# Patient Record
Sex: MALE | Race: BLACK OR AFRICAN AMERICAN | Employment: UNEMPLOYED | URBAN - METROPOLITAN AREA
[De-identification: names, ages, dates, MRNs, and addresses within clinical notes are randomized per-mention and may not be internally consistent; named-entity substitution may affect disease eponyms.]

---

## 2017-11-28 ENCOUNTER — HOSPITAL ENCOUNTER (EMERGENCY)
Age: 22
Discharge: HOME | End: 2017-11-28
Attending: EMERGENCY MEDICINE
Payer: MEDICAID

## 2017-11-28 VITALS
RESPIRATION RATE: 18 BRPM | SYSTOLIC BLOOD PRESSURE: 135 MMHG | TEMPERATURE: 98 F | OXYGEN SATURATION: 100 % | HEART RATE: 83 BPM | DIASTOLIC BLOOD PRESSURE: 79 MMHG

## 2017-11-28 DIAGNOSIS — K02.9 PAIN DUE TO DENTAL CARIES: Primary | ICD-10-CM

## 2017-11-28 PROCEDURE — 99282 EMERGENCY DEPT VISIT SF MDM: CPT

## 2017-11-28 RX ORDER — IBUPROFEN 600 MG/1
600 TABLET ORAL
Qty: 30 TAB | Refills: 0 | Status: SHIPPED | OUTPATIENT
Start: 2017-11-28

## 2017-11-28 RX ORDER — ESCITALOPRAM OXALATE 10 MG/1
10 TABLET ORAL DAILY
COMMUNITY

## 2017-11-28 RX ORDER — HYDROXYZINE PAMOATE 25 MG/1
25 CAPSULE ORAL
COMMUNITY

## 2017-11-28 RX ORDER — ACETAMINOPHEN 500 MG
1000 TABLET ORAL
Qty: 40 TAB | Refills: 0 | Status: SHIPPED | OUTPATIENT
Start: 2017-11-28

## 2017-11-28 NOTE — ED PROVIDER NOTES
HPI Comments: Bertha Baumann is a 23 y.o. Male with c/o dental pain for last 2 months on lower post teeth. No facial swelling, fever. Has 2 holes in teeth. No diff eating, swallowing,. Pain is throbbing, intermittent    The history is provided by the patient. Past Medical History:   Diagnosis Date    Depression        History reviewed. No pertinent surgical history. History reviewed. No pertinent family history. Social History     Social History    Marital status: SINGLE     Spouse name: N/A    Number of children: N/A    Years of education: N/A     Occupational History    Not on file. Social History Main Topics    Smoking status: Never Smoker    Smokeless tobacco: Never Used    Alcohol use No    Drug use: Not on file    Sexual activity: Not on file     Other Topics Concern    Not on file     Social History Narrative         ALLERGIES: Review of patient's allergies indicates no known allergies. Review of Systems   Constitutional: Negative for fever. HENT: Positive for dental problem. Negative for sore throat and trouble swallowing. Respiratory: Negative for shortness of breath. Cardiovascular: Negative for chest pain. Gastrointestinal: Negative for abdominal pain. Musculoskeletal: Negative for gait problem. Allergic/Immunologic: Negative for immunocompromised state. Psychiatric/Behavioral: Negative for sleep disturbance. Vitals:    11/28/17 0053   BP: 135/79   Pulse: 83   Resp: 18   Temp: 98 °F (36.7 °C)   SpO2: 100%            Physical Exam   Constitutional: He is oriented to person, place, and time. Non-toxic appearance. He does not appear ill. No distress. Pt eating in waiting room without diff   HENT:   Head: Normocephalic and atraumatic. Right Ear: External ear normal.   Left Ear: External ear normal.   Nose: Nose normal.   Mouth/Throat: Uvula is midline, oropharynx is clear and moist and mucous membranes are normal. No oral lesions.  No trismus in the jaw. Abnormal dentition. Dental caries present. No dental abscesses or uvula swelling. No oropharyngeal exudate, posterior oropharyngeal edema or posterior oropharyngeal erythema. Eyes: Conjunctivae are normal.   Neck: Normal range of motion. Cardiovascular: Normal rate, regular rhythm, normal heart sounds and intact distal pulses. Pulmonary/Chest: Effort normal and breath sounds normal. No respiratory distress. Abdominal: There is no tenderness. Musculoskeletal: Normal range of motion. Neurological: He is alert and oriented to person, place, and time. Skin: Skin is warm and dry. He is not diaphoretic. Psychiatric: His behavior is normal.   Nursing note and vitals reviewed. [facility]  ED Course       Procedures    Vitals:  Patient Vitals for the past 12 hrs:   Temp Pulse Resp BP SpO2   11/28/17 0053 98 °F (36.7 °C) 83 18 135/79 100 %         Medications ordered:   Medications - No data to display      Lab findings:  No results found for this or any previous visit (from the past 12 hour(s)). EKG interpretation by ED Physician:      X-Ray, CT or other radiology findings or impressions:  No orders to display       Progress notes, Consult notes or additional Procedure notes:   Doubt need for abx, any testing  I have discussed with patient and/or family/sig other the results, interpretation of any imaging if performed, suspected diagnosis and treatment plan to include instructions regarding the diagnoses listed to which understanding was expressed with all questions answered      Reevaluation of patient:   Stable for dc    Disposition:  Diagnosis:   1.  Pain due to dental caries        Disposition: home      Follow-up Information     Follow up With Details Comments Contact Info    [address] [facility] [address] Schedule an appointment as soon as possible for a visit  [address]  [phone]            Patient's Medications   Start Taking    ACETAMINOPHEN ([facility]  [address] STRENGTH) 500 MG TABLET    Take 2 Tabs by mouth every six (6) hours as needed for Pain. IBUPROFEN (MOTRIN) 600 MG TABLET    Take 1 Tab by mouth every six (6) hours as needed for Pain. Continue Taking    ESCITALOPRAM OXALATE (LEXAPRO) 10 MG TABLET    Take 10 mg by mouth daily. HYDROXYZINE PAMOATE (VISTARIL) 25 MG CAPSULE    Take 25 mg by mouth three (3) times daily as needed for Itching. These Medications have changed    No medications on file   Stop Taking    LORAZEPAM (ATIVAN) 1 MG TABLET    Take 1 Tab by mouth as needed for Anxiety.     as

## 2017-11-28 NOTE — ED NOTES
I have reviewed discharge instructions with the patient. The patient verbalized understanding. Discharge medications reviewed with patient and appropriate educational materials and side effects teaching were provided. Follow up reviewed. Patient armband removed and shredded.

## 2020-06-13 ENCOUNTER — HOSPITAL ENCOUNTER (EMERGENCY)
Age: 25
Discharge: PSYCHIATRIC OR SUBSTANCE USE FACILITY | End: 2020-06-14
Attending: EMERGENCY MEDICINE
Payer: MEDICAID

## 2020-06-13 DIAGNOSIS — R46.89 AGGRESSIVE BEHAVIOR: ICD-10-CM

## 2020-06-13 DIAGNOSIS — F10.920 ACUTE ALCOHOLIC INTOXICATION WITHOUT COMPLICATION (HCC): ICD-10-CM

## 2020-06-13 DIAGNOSIS — F20.9 SCHIZOPHRENIA, UNSPECIFIED TYPE (HCC): Primary | ICD-10-CM

## 2020-06-13 LAB
ALBUMIN SERPL-MCNC: 4.3 G/DL (ref 3.4–5)
ALBUMIN/GLOB SERPL: 1.3 {RATIO} (ref 0.8–1.7)
ALP SERPL-CCNC: 88 U/L (ref 45–117)
ALT SERPL-CCNC: 25 U/L (ref 16–61)
AMPHET UR QL SCN: NEGATIVE
ANION GAP SERPL CALC-SCNC: 9 MMOL/L (ref 3–18)
AST SERPL-CCNC: 22 U/L (ref 10–38)
BARBITURATES UR QL SCN: NEGATIVE
BASOPHILS # BLD: 0 K/UL (ref 0–0.1)
BASOPHILS NFR BLD: 0 % (ref 0–2)
BENZODIAZ UR QL: NEGATIVE
BILIRUB SERPL-MCNC: 0.5 MG/DL (ref 0.2–1)
BUN SERPL-MCNC: 10 MG/DL (ref 7–18)
BUN/CREAT SERPL: 9 (ref 12–20)
CALCIUM SERPL-MCNC: 8.9 MG/DL (ref 8.5–10.1)
CANNABINOIDS UR QL SCN: POSITIVE
CHLORIDE SERPL-SCNC: 109 MMOL/L (ref 100–111)
CO2 SERPL-SCNC: 22 MMOL/L (ref 21–32)
COCAINE UR QL SCN: NEGATIVE
CREAT SERPL-MCNC: 1.12 MG/DL (ref 0.6–1.3)
DIFFERENTIAL METHOD BLD: ABNORMAL
EOSINOPHIL # BLD: 0.2 K/UL (ref 0–0.4)
EOSINOPHIL NFR BLD: 2 % (ref 0–5)
ERYTHROCYTE [DISTWIDTH] IN BLOOD BY AUTOMATED COUNT: 13 % (ref 11.6–14.5)
GLOBULIN SER CALC-MCNC: 3.3 G/DL (ref 2–4)
GLUCOSE SERPL-MCNC: 81 MG/DL (ref 74–99)
HCT VFR BLD AUTO: 45.9 % (ref 36–48)
HDSCOM,HDSCOM: ABNORMAL
HGB BLD-MCNC: 16.2 G/DL (ref 13–16)
LYMPHOCYTES # BLD: 2.2 K/UL (ref 0.9–3.6)
LYMPHOCYTES NFR BLD: 24 % (ref 21–52)
MCH RBC QN AUTO: 32.2 PG (ref 24–34)
MCHC RBC AUTO-ENTMCNC: 35.3 G/DL (ref 31–37)
MCV RBC AUTO: 91.3 FL (ref 74–97)
METHADONE UR QL: NEGATIVE
MONOCYTES # BLD: 0.7 K/UL (ref 0.05–1.2)
MONOCYTES NFR BLD: 8 % (ref 3–10)
NEUTS SEG # BLD: 5.9 K/UL (ref 1.8–8)
NEUTS SEG NFR BLD: 66 % (ref 40–73)
OPIATES UR QL: NEGATIVE
PCP UR QL: NEGATIVE
PLATELET # BLD AUTO: 217 K/UL (ref 135–420)
PMV BLD AUTO: 10.5 FL (ref 9.2–11.8)
POTASSIUM SERPL-SCNC: 3.6 MMOL/L (ref 3.5–5.5)
PROT SERPL-MCNC: 7.6 G/DL (ref 6.4–8.2)
RBC # BLD AUTO: 5.03 M/UL (ref 4.7–5.5)
SODIUM SERPL-SCNC: 140 MMOL/L (ref 136–145)
WBC # BLD AUTO: 8.9 K/UL (ref 4.6–13.2)

## 2020-06-13 PROCEDURE — 85025 COMPLETE CBC W/AUTO DIFF WBC: CPT

## 2020-06-13 PROCEDURE — 99284 EMERGENCY DEPT VISIT MOD MDM: CPT

## 2020-06-13 PROCEDURE — 80053 COMPREHEN METABOLIC PANEL: CPT

## 2020-06-13 PROCEDURE — 80307 DRUG TEST PRSMV CHEM ANLYZR: CPT

## 2020-06-14 VITALS
OXYGEN SATURATION: 98 % | HEART RATE: 72 BPM | BODY MASS INDEX: 21.39 KG/M2 | DIASTOLIC BLOOD PRESSURE: 86 MMHG | SYSTOLIC BLOOD PRESSURE: 124 MMHG | TEMPERATURE: 97.7 F | RESPIRATION RATE: 15 BRPM | WEIGHT: 172 LBS | HEIGHT: 75 IN

## 2020-06-14 LAB
APAP SERPL-MCNC: 4 UG/ML (ref 10–30)
COVID-19 RAPID TEST, COVR: NORMAL
ETHANOL SERPL-MCNC: 230 MG/DL (ref 0–3)
SALICYLATES SERPL-MCNC: 1.7 MG/DL (ref 2.8–20)
SOURCE, COVRS: NORMAL

## 2020-06-14 PROCEDURE — 74011250637 HC RX REV CODE- 250/637

## 2020-06-14 PROCEDURE — 87635 SARS-COV-2 COVID-19 AMP PRB: CPT

## 2020-06-14 RX ORDER — IBUPROFEN 600 MG/1
600 TABLET ORAL
Status: COMPLETED | OUTPATIENT
Start: 2020-06-14 | End: 2020-06-14

## 2020-06-14 RX ORDER — ACETAMINOPHEN 325 MG/1
650 TABLET ORAL
Status: DISCONTINUED | OUTPATIENT
Start: 2020-06-14 | End: 2020-06-14

## 2020-06-14 RX ORDER — IBUPROFEN 600 MG/1
TABLET ORAL
Status: COMPLETED
Start: 2020-06-14 | End: 2020-06-14

## 2020-06-14 RX ADMIN — IBUPROFEN 600 MG: 600 TABLET, FILM COATED ORAL at 02:04

## 2020-06-14 RX ADMIN — IBUPROFEN 600 MG: 600 TABLET ORAL at 02:04

## 2020-06-14 NOTE — ED NOTES
TRANSFER - OUT REPORT:    Verbal report given to Jason Arreola RN(name) on Luca Plaza  being transferred to [facility](unit) for routine progression of care       Report consisted of patients Situation, Background, Assessment and   Recommendations(SBAR). Information from the following report(s) ED Summary, Procedure Summary, Intake/Output and MAR was reviewed with the receiving nurse. Lines:       Opportunity for questions and clarification was provided.       Patient transported with:   [facility]

## 2020-06-14 NOTE — ED TRIAGE NOTES
Pt is in police custody. Family called stating that he is schizophrenic and has not been taking his medications for months. He is generally calm and cooperative in triage. He is exhibiting some flight of ideas but is redirectable.   Is  Not homicidal or suicidal.

## 2020-06-14 NOTE — ED PROVIDER NOTES
EMERGENCY DEPARTMENT HISTORY AND PHYSICAL EXAM    2:40 AM      Date: 6/13/2020  Patient Name: Chantel Titus    History of Presenting Illness     Chief Complaint   Patient presents with   [address] Problem         History Provided By: Patient      Additional History (Context): Chantel Titus is a 23 y.o. male who presents with mental health problem. Patient is in custody of police, he is in [address] [place] [address]. Per nursing staff the patient has already been evaluated by [facility] and he is here for medical clearance. The patient does have a history of schizophrenia has been off his medications for a while. Per the police officers, the patient lives at home and his father has been having issues with his aggressive behavior. He did have a restraining order against him, and the patient was signing the papers today. The patient started having an outburst and started getting aggressive. Police were called. The patient currently is denying any suicidal homicidal ideations. He states that he has been suicidal in the past but is not now, no plan. No homicidal ideations. Patient has no other complaints at this time. PCP: None      Current Outpatient Medications   Medication Sig Dispense Refill    hydrOXYzine pamoate (VISTARIL) 25 mg capsule Take 25 mg by mouth three (3) times daily as needed for Itching.  escitalopram oxalate (LEXAPRO) 10 mg tablet Take 10 mg by mouth daily.  ibuprofen (MOTRIN) 600 mg tablet Take 1 Tab by mouth every six (6) hours as needed for Pain. 30 Tab 0    acetaminophen (TYLENOL EXTRA STRENGTH) 500 mg tablet Take 2 Tabs by mouth every six (6) hours as needed for Pain. 40 Tab 0       Past History     Past Medical History:  Past Medical History:   Diagnosis Date    Depression        Past Surgical History:  No past surgical history on file. Family History:  No family history on file.     Social History:  Social History     Tobacco Use    Smoking status: Never Smoker    Smokeless tobacco: Never Used   Substance Use Topics    Alcohol use: No    Drug use: Not on file       Allergies:  No Known Allergies      Review of Systems       Review of Systems   Constitutional: Negative for chills, fatigue and fever. HENT: Negative for congestion, rhinorrhea, sinus pressure, sinus pain, sneezing and sore throat. Eyes: Negative for photophobia and visual disturbance. Respiratory: Negative for cough, shortness of breath and wheezing. Cardiovascular: Negative for chest pain and palpitations. Gastrointestinal: Negative for abdominal pain, constipation, diarrhea, nausea and vomiting. Genitourinary: Negative for dysuria, frequency and hematuria. Musculoskeletal: Negative for back pain, neck pain and neck stiffness. Skin: Negative for rash and wound. Neurological: Negative for dizziness, light-headedness and headaches. Psychiatric/Behavioral: Positive for agitation and behavioral problems. Negative for sleep disturbance and suicidal ideas. Physical Exam     Visit Vitals  /83   Pulse 98   Temp 98.3 °F (36.8 °C)   Resp 15   Ht 6' 2.5\" (1.892 m)   Wt 78 kg (172 lb)   SpO2 98%   BMI 21.79 kg/m²       Physical Exam  Constitutional:       General: He is not in acute distress. Appearance: He is not toxic-appearing. HENT:      Head: Normocephalic and atraumatic. Mouth/Throat:      Mouth: Mucous membranes are moist.   Eyes:      Pupils: Pupils are equal, round, and reactive to light. Neck:      Musculoskeletal: Normal range of motion. No neck rigidity. Cardiovascular:      Rate and Rhythm: Normal rate. Heart sounds: No murmur. No gallop. Pulmonary:      Effort: No respiratory distress. Breath sounds: Normal breath sounds. No wheezing. Abdominal:      General: There is no distension. Palpations: Abdomen is soft. Tenderness: There is no abdominal tenderness. Musculoskeletal: Normal range of motion. General: No swelling or deformity. Lymphadenopathy:      Cervical: No cervical adenopathy. Skin:     General: Skin is warm. Capillary Refill: Capillary refill takes less than 2 seconds. Coloration: Skin is not jaundiced. Findings: No bruising. Neurological:      Mental Status: He is alert and oriented to person, place, and time. Cranial Nerves: No cranial nerve deficit. Motor: No weakness. Psychiatric:         Mood and Affect: Mood normal.         Thought Content: Thought content normal. Thought content does not include homicidal or suicidal ideation. Thought content does not include homicidal or suicidal plan. Comments: Patient does have incoherent speech at times. He does ramble. He states that in the past he has had suicidal ideations, but does not have any currently. No homicidal ideations. He does laugh inappropriately during the conversation. Diagnostic Study Results     Labs -  Recent Results (from the past 12 hour(s))   DRUG SCREEN, URINE    Collection Time: 06/13/20 10:38 PM   Result Value Ref Range    BENZODIAZEPINES Negative NEG      BARBITURATES Negative NEG      THC (TH-CANNABINOL) Positive (A) NEG      OPIATES Negative NEG      PCP(PHENCYCLIDINE) Negative NEG      COCAINE Negative NEG      AMPHETAMINES Negative NEG      METHADONE Negative NEG      HDSCOM (NOTE)    CBC WITH AUTOMATED DIFF    Collection Time: 06/13/20 10:57 PM   Result Value Ref Range    WBC 8.9 4.6 - 13.2 K/uL    RBC 5.03 4.70 - 5.50 M/uL    HGB 16.2 (H) 13.0 - 16.0 g/dL    HCT 45.9 36.0 - 48.0 %    MCV 91.3 74.0 - 97.0 FL    MCH 32.2 24.0 - 34.0 PG    MCHC 35.3 31.0 - 37.0 g/dL    RDW 13.0 11.6 - 14.5 %    PLATELET 234 679 - 918 K/uL    MPV 10.5 9.2 - 11.8 FL    NEUTROPHILS 66 40 - 73 %    LYMPHOCYTES 24 21 - 52 %    MONOCYTES 8 3 - 10 %    EOSINOPHILS 2 0 - 5 %    BASOPHILS 0 0 - 2 %    ABS. NEUTROPHILS 5.9 1.8 - 8.0 K/UL    ABS. LYMPHOCYTES 2.2 0.9 - 3.6 K/UL    ABS. MONOCYTES 0.7 0.05 - 1.2 K/UL    ABS.  EOSINOPHILS 0.2 0.0 - 0.4 K/UL    ABS. BASOPHILS 0.0 0.0 - 0.1 K/UL    DF AUTOMATED     METABOLIC PANEL, COMPREHENSIVE    Collection Time: 06/13/20 10:57 PM   Result Value Ref Range    Sodium 140 136 - 145 mmol/L    Potassium 3.6 3.5 - 5.5 mmol/L    Chloride 109 100 - 111 mmol/L    CO2 22 21 - 32 mmol/L    Anion gap 9 3.0 - 18 mmol/L    Glucose 81 74 - 99 mg/dL    BUN 10 7.0 - 18 MG/DL    Creatinine 1.12 0.6 - 1.3 MG/DL    BUN/Creatinine ratio 9 (L) 12 - 20      GFR est AA >60 >60 ml/min/1.73m2    GFR est non-AA >60 >60 ml/min/1.73m2    Calcium 8.9 8.5 - 10.1 MG/DL    Bilirubin, total 0.5 0.2 - 1.0 MG/DL    ALT (SGPT) 25 16 - 61 U/L    AST (SGOT) 22 10 - 38 U/L    Alk. phosphatase 88 45 - 117 U/L    Protein, total 7.6 6.4 - 8.2 g/dL    Albumin 4.3 3.4 - 5.0 g/dL    Globulin 3.3 2.0 - 4.0 g/dL    A-G Ratio 1.3 0.8 - 1.7     ETHYL ALCOHOL    Collection Time: 06/13/20 10:57 PM   Result Value Ref Range    ALCOHOL(ETHYL),SERUM 230 (H) 0 - 3 MG/DL   ACETAMINOPHEN    Collection Time: 06/13/20 10:57 PM   Result Value Ref Range    Acetaminophen level 4 (L) 10.0 - 30.2 ug/mL   SALICYLATE    Collection Time: 06/13/20 10:57 PM   Result Value Ref Range    Salicylate level 1.7 (L) 2.8 - 20.0 MG/DL   SARS-COV-2    Collection Time: 06/14/20 12:15 AM   Result Value Ref Range    Specimen source Nasopharyngeal      COVID-19 rapid test NON DETECTED NOTD       Radiologic Studies -   No orders to display         Medical Decision Making   I am the first provider for this patient. I reviewed the vital signs, available nursing notes, past medical history, past surgical history, family history and social history. Vital Signs-Reviewed the patient's vital signs. Records Reviewed: Nursing Notes (Time of Review: 2:40 AM)    ED Course: Progress Notes, Reevaluation, and Consults:  Patient has been conversing with he is in no acute distress. I did receive an update from the nurse, apparently [facility] was post be picking up a TDO.   They are still currently looking for placement. We did hear word that the patient will be going to some psychiatric facility in [place], but further details will be coming soon. We still do not have a bed or accepting physician. I will be signing this patient out to the oncoming physician, Alessandro Ferreira. This signout will take place at the onset of her shift at 0600. Provider Notes (Medical Decision Making): [facility]        Critical Care Time: 0      Diagnosis     Clinical Impression:   1. Schizophrenia, unspecified type ([facility]    2. Aggressive behavior    3. Acute alcoholic intoxication without complication ([facility]        Disposition: Pending    Follow-up Information    None          Patient's Medications   Start Taking    No medications on file   Continue Taking    ACETAMINOPHEN (TYLENOL EXTRA STRENGTH) 500 MG TABLET    Take 2 Tabs by mouth every six (6) hours as needed for Pain. ESCITALOPRAM OXALATE (LEXAPRO) 10 MG TABLET    Take 10 mg by mouth daily. HYDROXYZINE PAMOATE (VISTARIL) 25 MG CAPSULE    Take 25 mg by mouth three (3) times daily as needed for Itching. IBUPROFEN (MOTRIN) 600 MG TABLET    Take 1 Tab by mouth every six (6) hours as needed for Pain. These Medications have changed    No medications on file   Stop Taking    No medications on file     _______________________________    Please note that this dictation was completed with Imaxio, the computer voice recognition software. Quite often unanticipated grammatical, syntax, homophones, and other interpretive errors are inadvertently transcribed by the computer software. Please disregard these errors. Please excuse any errors that have escaped final proofreading.

## 2020-06-14 NOTE — ED NOTES
0 600. Patient signed out by Dr. Newton Oh. Patient has a history of schizophrenia. Patient has been aggressive. Brought in by [facility] now is on TDO. Looking for placement. 0 640. Patient reassessed. Awake talking in NAD. police officers at bedside. 0 745. Spoke with nurse practitioner Mane Merino. At [facility]. Patient has been accepted there. Please transports already here. Has been waiting for her call. At this point will get Intal forms filled out and patient will be transferred for further evaluation and care. Patient is on TDO and will be in police custody. Impression: Depression with aggressive behavior. Danger to self and others. Patient be transferred in stable and improved condition.

## 2020-06-14 NOTE — ED NOTES
Assume care of patient ,patient alert and oriented x 4, police at bedside, sitter at bedside, patient is a TDO, waiting for placement and excepting doctor, POC discussed with patient

## 2023-12-26 ENCOUNTER — HOSPITAL ENCOUNTER (EMERGENCY)
Facility: HOSPITAL | Age: 28
Discharge: HOME | End: 2023-12-26
Payer: COMMERCIAL

## 2023-12-26 VITALS
TEMPERATURE: 97.9 F | HEART RATE: 83 BPM | DIASTOLIC BLOOD PRESSURE: 90 MMHG | RESPIRATION RATE: 15 BRPM | OXYGEN SATURATION: 98 % | WEIGHT: 175 LBS | BODY MASS INDEX: 22.46 KG/M2 | HEIGHT: 74 IN | SYSTOLIC BLOOD PRESSURE: 128 MMHG

## 2023-12-26 DIAGNOSIS — J02.9 SORE THROAT: Primary | ICD-10-CM

## 2023-12-26 PROCEDURE — 6370000000 HC RX 637 (ALT 250 FOR IP): Performed by: PHYSICIAN ASSISTANT

## 2023-12-26 PROCEDURE — 99283 EMERGENCY DEPT VISIT LOW MDM: CPT

## 2023-12-26 RX ADMIN — LIDOCAINE HYDROCHLORIDE 40 ML: 20 SOLUTION OROPHARYNGEAL at 11:49

## 2023-12-26 ASSESSMENT — PAIN SCALES - GENERAL: PAINLEVEL_OUTOF10: 10

## 2023-12-26 ASSESSMENT — PAIN DESCRIPTION - LOCATION: LOCATION: THROAT

## 2023-12-26 ASSESSMENT — PAIN - FUNCTIONAL ASSESSMENT: PAIN_FUNCTIONAL_ASSESSMENT: 0-10

## 2023-12-26 ASSESSMENT — LIFESTYLE VARIABLES: HOW OFTEN DO YOU HAVE A DRINK CONTAINING ALCOHOL: NEVER

## 2023-12-26 NOTE — ED PROVIDER NOTES
EMERGENCY DEPARTMENT HISTORY AND PHYSICAL EXAM      Date: 12/26/2023  Patient Name: Schuyler Murphy    History of Presenting Illness     Chief Complaint   Patient presents with    Pain     Throat       History (Context): Schuyler Murphy is a 28 y.o. male with significant past medical history of depression presents ambulatory to the ED today.  Patient reports he was smoking crack cocaine yesterday when he accidentally \"swallowed a piece\" on feels that he burned his throat.  Patient ports increased pain with swallowing.  Denies difficulty eating or breathing.  Patient has not taken anything for symptoms.  Patient is up-to-date on his tetanus.       PCP: No primary care provider on file.    No current facility-administered medications for this encounter.     Current Outpatient Medications   Medication Sig Dispense Refill    clindamycin (CLEOCIN) 300 MG capsule Take 1 capsule by mouth 4 times daily for 10 days 40 capsule 0    predniSONE 10 MG (21) TBPK Take 6 tablets on day 1; take 5 tablets on day 2; take 4 tablets on day 3; take 3 tablets on day 4; take 2 tablets on day 5; take 1 tablet on day 6. 21 each 1    acetaminophen (TYLENOL) 500 MG tablet Take 1,000 mg by mouth every 6 hours as needed      escitalopram (LEXAPRO) 10 MG tablet Take 10 mg by mouth daily      hydrOXYzine pamoate (VISTARIL) 25 MG capsule Take 25 mg by mouth 3 times daily as needed      ibuprofen (ADVIL;MOTRIN) 600 MG tablet Take 600 mg by mouth every 6 hours as needed         Past History     Past Medical History:   Past Medical History:   Diagnosis Date    Depression        Past Surgical History:  No past surgical history on file.    Family History:  No family history on file.    Social History:   Social History     Tobacco Use    Smoking status: Never    Smokeless tobacco: Never   Substance Use Topics    Alcohol use: No       Allergies:  No Known Allergies    PMH, PSH, family history, social history, allergies reviewed with the patient with  history and social history. Vital Signs-Reviewed the patient's vital signs. Records Reviewed: Personally, on initial evaluation    MDM:   DDX includes but is not limited to: Burn, Abrasion    Will obtain lab work and imaging for further evaluation of patients complaint. Will continue to monitor and evaluate patient while in the ED. Orders as below:  No orders of the defined types were placed in this encounter. Gilberto De Leon presents with complaint of sore throat after possibly burning his esophagus while smoking crack cocaine. No burn visualized on exam.  Maintaining air without difficulty. Patient afebrile not tachycardic. Will discharge home with viscous lidocaine and instructed on importance of not smoking crack cocaine. At time of discharge, pt non-toxic appearing in NAD. Pt stable for prompt outpatient follow-up with PCP 1 to 2 days. Patient given strict instructions to return if symptoms worsen. ED Course:            Procedures:  Procedures        Documentation/Prior Results Review: Old medical records. Nursing notes. Diagnosis and Disposition     CLINICAL IMPRESSION:  No diagnosis found. Medication List        ASK your doctor about these medications      acetaminophen 500 MG tablet  Commonly known as: TYLENOL     escitalopram 10 MG tablet  Commonly known as: LEXAPRO     hydrOXYzine pamoate 25 MG capsule  Commonly known as: VISTARIL     ibuprofen 600 MG tablet  Commonly known as: ADVIL;MOTRIN              Disposition: Discharged    Patient condition at time of disposition: Stable    DISCHARGE NOTE:   Pt has been reexamined. Patient has no new complaints, changes, or physical findings. Care plan outlined and precautions discussed. Results were reviewed with the patient. All medications were reviewed with the patient. All of pt's questions and concerns were addressed.   Alarm symptoms and return precautions associated with chief complaint and evaluation were reviewed

## 2023-12-26 NOTE — ED TRIAGE NOTES
PT presents to the emergency department via ems c/o pain in the throat after smoking crack. PT states \"maybe I didn't make the pipe wrong but I feel pain in my throat\".

## 2024-01-01 ENCOUNTER — HOSPITAL ENCOUNTER (EMERGENCY)
Facility: HOSPITAL | Age: 29
Discharge: HOME | End: 2024-01-01
Payer: COMMERCIAL

## 2024-01-01 ENCOUNTER — APPOINTMENT (OUTPATIENT)
Facility: HOSPITAL | Age: 29
End: 2024-01-01
Payer: COMMERCIAL

## 2024-01-01 VITALS
HEART RATE: 71 BPM | OXYGEN SATURATION: 99 % | SYSTOLIC BLOOD PRESSURE: 135 MMHG | BODY MASS INDEX: 22.46 KG/M2 | TEMPERATURE: 99 F | HEIGHT: 74 IN | WEIGHT: 175 LBS | DIASTOLIC BLOOD PRESSURE: 85 MMHG | RESPIRATION RATE: 18 BRPM

## 2024-01-01 DIAGNOSIS — R93.89 ABNORMAL CT SCAN, NECK: Primary | ICD-10-CM

## 2024-01-01 LAB
ALBUMIN SERPL-MCNC: 3.5 G/DL (ref 3.4–5)
ALBUMIN/GLOB SERPL: 0.9 (ref 0.8–1.7)
ALP SERPL-CCNC: 65 U/L (ref 45–117)
ALT SERPL-CCNC: 23 U/L (ref 16–61)
ANION GAP SERPL CALC-SCNC: 1 MMOL/L (ref 3–18)
AST SERPL-CCNC: 19 U/L (ref 10–38)
BASOPHILS # BLD: 0.1 K/UL (ref 0–0.1)
BASOPHILS NFR BLD: 1 % (ref 0–2)
BILIRUB SERPL-MCNC: 0.4 MG/DL (ref 0.2–1)
BUN SERPL-MCNC: 8 MG/DL (ref 7–18)
BUN/CREAT SERPL: 8 (ref 12–20)
CALCIUM SERPL-MCNC: 9.4 MG/DL (ref 8.5–10.1)
CHLORIDE SERPL-SCNC: 106 MMOL/L (ref 100–111)
CO2 SERPL-SCNC: 32 MMOL/L (ref 21–32)
CREAT SERPL-MCNC: 1.03 MG/DL (ref 0.6–1.3)
DIFFERENTIAL METHOD BLD: ABNORMAL
EOSINOPHIL # BLD: 0.5 K/UL (ref 0–0.4)
EOSINOPHIL NFR BLD: 5 % (ref 0–5)
ERYTHROCYTE [DISTWIDTH] IN BLOOD BY AUTOMATED COUNT: 13.2 % (ref 11.6–14.5)
GLOBULIN SER CALC-MCNC: 3.7 G/DL (ref 2–4)
GLUCOSE SERPL-MCNC: 92 MG/DL (ref 74–99)
HCT VFR BLD AUTO: 42.2 % (ref 36–48)
HGB BLD-MCNC: 14.1 G/DL (ref 13–16)
IMM GRANULOCYTES # BLD AUTO: 0 K/UL (ref 0–0.04)
IMM GRANULOCYTES NFR BLD AUTO: 0 % (ref 0–0.5)
LYMPHOCYTES # BLD: 1.2 K/UL (ref 0.9–3.6)
LYMPHOCYTES NFR BLD: 13 % (ref 21–52)
MCH RBC QN AUTO: 30.1 PG (ref 24–34)
MCHC RBC AUTO-ENTMCNC: 33.4 G/DL (ref 31–37)
MCV RBC AUTO: 90 FL (ref 78–100)
MONOCYTES # BLD: 0.8 K/UL (ref 0.05–1.2)
MONOCYTES NFR BLD: 9 % (ref 3–10)
NEUTS SEG # BLD: 6.4 K/UL (ref 1.8–8)
NEUTS SEG NFR BLD: 72 % (ref 40–73)
NRBC # BLD: 0 K/UL (ref 0–0.01)
NRBC BLD-RTO: 0 PER 100 WBC
PLATELET # BLD AUTO: 273 K/UL (ref 135–420)
PMV BLD AUTO: 9.5 FL (ref 9.2–11.8)
POTASSIUM SERPL-SCNC: 4.3 MMOL/L (ref 3.5–5.5)
PROT SERPL-MCNC: 7.2 G/DL (ref 6.4–8.2)
RBC # BLD AUTO: 4.69 M/UL (ref 4.35–5.65)
SODIUM SERPL-SCNC: 139 MMOL/L (ref 136–145)
WBC # BLD AUTO: 9 K/UL (ref 4.6–13.2)

## 2024-01-01 PROCEDURE — 96375 TX/PRO/DX INJ NEW DRUG ADDON: CPT

## 2024-01-01 PROCEDURE — 96366 THER/PROPH/DIAG IV INF ADDON: CPT

## 2024-01-01 PROCEDURE — 80053 COMPREHEN METABOLIC PANEL: CPT

## 2024-01-01 PROCEDURE — 85025 COMPLETE CBC W/AUTO DIFF WBC: CPT

## 2024-01-01 PROCEDURE — 6360000004 HC RX CONTRAST MEDICATION: Performed by: EMERGENCY MEDICINE

## 2024-01-01 PROCEDURE — 2580000003 HC RX 258: Performed by: EMERGENCY MEDICINE

## 2024-01-01 PROCEDURE — 96365 THER/PROPH/DIAG IV INF INIT: CPT

## 2024-01-01 PROCEDURE — 99285 EMERGENCY DEPT VISIT HI MDM: CPT

## 2024-01-01 PROCEDURE — 6360000002 HC RX W HCPCS: Performed by: EMERGENCY MEDICINE

## 2024-01-01 PROCEDURE — 70492 CT SFT TSUE NCK W/O & W/DYE: CPT

## 2024-01-01 RX ORDER — PREDNISONE 10 MG/1
TABLET ORAL
Qty: 21 EACH | Refills: 1 | Status: SHIPPED | OUTPATIENT
Start: 2024-01-01

## 2024-01-01 RX ORDER — CLINDAMYCIN HYDROCHLORIDE 300 MG/1
300 CAPSULE ORAL 4 TIMES DAILY
Qty: 40 CAPSULE | Refills: 0 | Status: SHIPPED | OUTPATIENT
Start: 2024-01-01 | End: 2024-01-11

## 2024-01-01 RX ORDER — DEXAMETHASONE SODIUM PHOSPHATE 4 MG/ML
10 INJECTION, SOLUTION INTRA-ARTICULAR; INTRALESIONAL; INTRAMUSCULAR; INTRAVENOUS; SOFT TISSUE
Status: COMPLETED | OUTPATIENT
Start: 2024-01-01 | End: 2024-01-01

## 2024-01-01 RX ORDER — 0.9 % SODIUM CHLORIDE 0.9 %
1000 INTRAVENOUS SOLUTION INTRAVENOUS ONCE
Status: COMPLETED | OUTPATIENT
Start: 2024-01-01 | End: 2024-01-01

## 2024-01-01 RX ORDER — CLINDAMYCIN PHOSPHATE 900 MG/50ML
900 INJECTION, SOLUTION INTRAVENOUS
Status: COMPLETED | OUTPATIENT
Start: 2024-01-01 | End: 2024-01-01

## 2024-01-01 RX ADMIN — CLINDAMYCIN PHOSPHATE 900 MG: 900 INJECTION, SOLUTION INTRAVENOUS at 16:32

## 2024-01-01 RX ADMIN — SODIUM CHLORIDE 1000 ML: 9 INJECTION, SOLUTION INTRAVENOUS at 16:38

## 2024-01-01 RX ADMIN — IOPAMIDOL 80 ML: 612 INJECTION, SOLUTION INTRAVENOUS at 15:36

## 2024-01-01 RX ADMIN — DEXAMETHASONE SODIUM PHOSPHATE 10 MG: 4 INJECTION INTRA-ARTICULAR; INTRALESIONAL; INTRAMUSCULAR; INTRAVENOUS; SOFT TISSUE at 16:36

## 2024-01-01 ASSESSMENT — ENCOUNTER SYMPTOMS
EYES NEGATIVE: 1
GASTROINTESTINAL NEGATIVE: 1
ALLERGIC/IMMUNOLOGIC NEGATIVE: 1
TROUBLE SWALLOWING: 1
RESPIRATORY NEGATIVE: 1
SORE THROAT: 1

## 2024-01-01 NOTE — ED PROVIDER NOTES
history on file.       SOCIAL HISTORY       Social History     Socioeconomic History    Marital status: Single   Tobacco Use    Smoking status: Never    Smokeless tobacco: Never   Substance and Sexual Activity    Alcohol use: No       SCREENINGS         Patrick Coma Scale  Eye Opening: Spontaneous  Best Verbal Response: Oriented  Best Motor Response: Obeys commands  [place] Coma Scale Score: 15                     CIWA Assessment  BP: 135/85  Pulse: 71                 PHYSICAL EXAM       ED Triage Vitals [01/01/24 1342]   BP Temp Temp Source Pulse Respirations SpO2 Height Weight - Scale   135/85 99 °F (37.2 °C) Oral 71 18 99 % 1.88 m (6' 2\") 79.4 kg (175 lb)       Physical Exam  Vitals and nursing note reviewed.   Constitutional:       General: He is not in acute distress.     Appearance: Normal appearance. He is normal weight. He is not ill-appearing, toxic-appearing or diaphoretic.   HENT:      Head: Normocephalic and atraumatic.      Nose: Nose normal.      Mouth/Throat:      Mouth: Mucous membranes are moist.      Pharynx: Posterior oropharyngeal erythema present.      Comments: No visible foreign body  Eyes:      Extraocular Movements: Extraocular movements intact.   Cardiovascular:      Rate and Rhythm: Normal rate and regular rhythm.      Pulses: Normal pulses.      Heart sounds: Normal heart sounds.   Pulmonary:      Effort: Pulmonary effort is normal.      Breath sounds: Normal breath sounds. No wheezing or rales.   Abdominal:      General: Abdomen is flat.      Palpations: Abdomen is soft.      Tenderness: There is no abdominal tenderness.   Musculoskeletal:         General: No deformity or signs of injury. Normal range of motion.      Cervical back: Normal range of motion and neck supple. No rigidity.   Skin:     General: Skin is warm.   Neurological:      Mental Status: He is alert and oriented to person, place, and time.      Cranial Nerves: No cranial nerve deficit.      Sensory: No sensory deficit.

## 2024-01-01 NOTE — ED TRIAGE NOTES
Pt arrived via EMS for reported throat pain.Pt states she was smoking crack about a week ago when the crack pipe ruptured and he inhaled some of the the glass.  Pt states he was seen on the 26 th for throat pain.  Pt reports worsening throat pain ans throat swelling along with spitting blood.

## 2024-02-20 ENCOUNTER — HOSPITAL ENCOUNTER (EMERGENCY)
Facility: HOSPITAL | Age: 29
Discharge: HOME | End: 2024-02-20
Attending: EMERGENCY MEDICINE
Payer: COMMERCIAL

## 2024-02-20 ENCOUNTER — APPOINTMENT (OUTPATIENT)
Facility: HOSPITAL | Age: 29
End: 2024-02-20
Payer: COMMERCIAL

## 2024-02-20 VITALS
RESPIRATION RATE: 15 BRPM | HEART RATE: 60 BPM | SYSTOLIC BLOOD PRESSURE: 105 MMHG | OXYGEN SATURATION: 96 % | TEMPERATURE: 98.2 F | WEIGHT: 175 LBS | HEIGHT: 74 IN | DIASTOLIC BLOOD PRESSURE: 76 MMHG | BODY MASS INDEX: 22.46 KG/M2

## 2024-02-20 DIAGNOSIS — F19.10 POLYSUBSTANCE ABUSE (HCC): ICD-10-CM

## 2024-02-20 DIAGNOSIS — R53.83 FATIGUE, UNSPECIFIED TYPE: Primary | ICD-10-CM

## 2024-02-20 LAB
ALBUMIN SERPL-MCNC: 3.7 G/DL (ref 3.4–5)
ALBUMIN/GLOB SERPL: 1.3 (ref 0.8–1.7)
ALP SERPL-CCNC: 63 U/L (ref 45–117)
ALT SERPL-CCNC: 22 U/L (ref 16–61)
AMPHET UR QL SCN: NEGATIVE
ANION GAP SERPL CALC-SCNC: 6 MMOL/L (ref 3–18)
APAP SERPL-MCNC: <2 UG/ML (ref 10–30)
AST SERPL-CCNC: 18 U/L (ref 10–38)
BARBITURATES UR QL SCN: NEGATIVE
BASOPHILS # BLD: 0.1 K/UL (ref 0–0.1)
BASOPHILS NFR BLD: 1 % (ref 0–2)
BENZODIAZ UR QL: NEGATIVE
BILIRUB SERPL-MCNC: 0.4 MG/DL (ref 0.2–1)
BUN SERPL-MCNC: 7 MG/DL (ref 7–18)
BUN/CREAT SERPL: 7 (ref 12–20)
CALCIUM SERPL-MCNC: 9 MG/DL (ref 8.5–10.1)
CANNABINOIDS UR QL SCN: POSITIVE
CHLORIDE SERPL-SCNC: 110 MMOL/L (ref 100–111)
CO2 SERPL-SCNC: 27 MMOL/L (ref 21–32)
COCAINE UR QL SCN: POSITIVE
CREAT SERPL-MCNC: 0.98 MG/DL (ref 0.6–1.3)
DIFFERENTIAL METHOD BLD: ABNORMAL
EKG ATRIAL RATE: 65 BPM
EKG DIAGNOSIS: NORMAL
EKG P AXIS: 40 DEGREES
EKG P-R INTERVAL: 172 MS
EKG Q-T INTERVAL: 388 MS
EKG QRS DURATION: 96 MS
EKG QTC CALCULATION (BAZETT): 403 MS
EKG R AXIS: 73 DEGREES
EKG T AXIS: 57 DEGREES
EKG VENTRICULAR RATE: 65 BPM
EOSINOPHIL # BLD: 0.1 K/UL (ref 0–0.4)
EOSINOPHIL NFR BLD: 2 % (ref 0–5)
ERYTHROCYTE [DISTWIDTH] IN BLOOD BY AUTOMATED COUNT: 14.4 % (ref 11.6–14.5)
ETHANOL SERPL-MCNC: 13 MG/DL (ref 0–3)
GLOBULIN SER CALC-MCNC: 2.8 G/DL (ref 2–4)
GLUCOSE BLD STRIP.AUTO-MCNC: 79 MG/DL (ref 70–110)
GLUCOSE SERPL-MCNC: 88 MG/DL (ref 74–99)
HCT VFR BLD AUTO: 42.2 % (ref 36–48)
HGB BLD-MCNC: 14.2 G/DL (ref 13–16)
IMM GRANULOCYTES # BLD AUTO: 0 K/UL (ref 0–0.04)
IMM GRANULOCYTES NFR BLD AUTO: 0 % (ref 0–0.5)
LYMPHOCYTES # BLD: 1.6 K/UL (ref 0.9–3.6)
LYMPHOCYTES NFR BLD: 20 % (ref 21–52)
Lab: ABNORMAL
MAGNESIUM SERPL-MCNC: 1.8 MG/DL (ref 1.6–2.6)
MCH RBC QN AUTO: 30.7 PG (ref 24–34)
MCHC RBC AUTO-ENTMCNC: 33.6 G/DL (ref 31–37)
MCV RBC AUTO: 91.3 FL (ref 78–100)
METHADONE UR QL: NEGATIVE
MONOCYTES # BLD: 0.7 K/UL (ref 0.05–1.2)
MONOCYTES NFR BLD: 9 % (ref 3–10)
NEUTS SEG # BLD: 5.5 K/UL (ref 1.8–8)
NEUTS SEG NFR BLD: 68 % (ref 40–73)
NRBC # BLD: 0 K/UL (ref 0–0.01)
NRBC BLD-RTO: 0 PER 100 WBC
OPIATES UR QL: NEGATIVE
PCP UR QL: NEGATIVE
PLATELET # BLD AUTO: 189 K/UL (ref 135–420)
PMV BLD AUTO: 9.9 FL (ref 9.2–11.8)
POTASSIUM SERPL-SCNC: 3.6 MMOL/L (ref 3.5–5.5)
PROT SERPL-MCNC: 6.5 G/DL (ref 6.4–8.2)
RBC # BLD AUTO: 4.62 M/UL (ref 4.35–5.65)
SALICYLATES SERPL-MCNC: 2.1 MG/DL (ref 2.8–20)
SODIUM SERPL-SCNC: 143 MMOL/L (ref 136–145)
WBC # BLD AUTO: 8.1 K/UL (ref 4.6–13.2)

## 2024-02-20 PROCEDURE — 6370000000 HC RX 637 (ALT 250 FOR IP): Performed by: EMERGENCY MEDICINE

## 2024-02-20 PROCEDURE — 93010 ELECTROCARDIOGRAM REPORT: CPT | Performed by: INTERNAL MEDICINE

## 2024-02-20 PROCEDURE — 99284 EMERGENCY DEPT VISIT MOD MDM: CPT

## 2024-02-20 PROCEDURE — 6360000002 HC RX W HCPCS: Performed by: EMERGENCY MEDICINE

## 2024-02-20 PROCEDURE — 2580000003 HC RX 258: Performed by: EMERGENCY MEDICINE

## 2024-02-20 PROCEDURE — 82077 ASSAY SPEC XCP UR&BREATH IA: CPT

## 2024-02-20 PROCEDURE — 83735 ASSAY OF MAGNESIUM: CPT

## 2024-02-20 PROCEDURE — 72125 CT NECK SPINE W/O DYE: CPT

## 2024-02-20 PROCEDURE — 80307 DRUG TEST PRSMV CHEM ANLYZR: CPT

## 2024-02-20 PROCEDURE — 80143 DRUG ASSAY ACETAMINOPHEN: CPT

## 2024-02-20 PROCEDURE — 82962 GLUCOSE BLOOD TEST: CPT

## 2024-02-20 PROCEDURE — 85025 COMPLETE CBC W/AUTO DIFF WBC: CPT

## 2024-02-20 PROCEDURE — 93005 ELECTROCARDIOGRAM TRACING: CPT | Performed by: EMERGENCY MEDICINE

## 2024-02-20 PROCEDURE — 80053 COMPREHEN METABOLIC PANEL: CPT

## 2024-02-20 PROCEDURE — 70450 CT HEAD/BRAIN W/O DYE: CPT

## 2024-02-20 PROCEDURE — 80179 DRUG ASSAY SALICYLATE: CPT

## 2024-02-20 PROCEDURE — 96374 THER/PROPH/DIAG INJ IV PUSH: CPT

## 2024-02-20 RX ORDER — ONDANSETRON 2 MG/ML
4 INJECTION INTRAMUSCULAR; INTRAVENOUS
Status: COMPLETED | OUTPATIENT
Start: 2024-02-20 | End: 2024-02-20

## 2024-02-20 RX ORDER — 0.9 % SODIUM CHLORIDE 0.9 %
1000 INTRAVENOUS SOLUTION INTRAVENOUS ONCE
Status: COMPLETED | OUTPATIENT
Start: 2024-02-20 | End: 2024-02-20

## 2024-02-20 RX ADMIN — LIDOCAINE HYDROCHLORIDE 40 ML: 20 SOLUTION OROPHARYNGEAL at 06:20

## 2024-02-20 RX ADMIN — SODIUM CHLORIDE 1000 ML: 9 INJECTION, SOLUTION INTRAVENOUS at 01:44

## 2024-02-20 RX ADMIN — ONDANSETRON 4 MG: 2 INJECTION INTRAMUSCULAR; INTRAVENOUS at 06:22

## 2024-02-20 ASSESSMENT — PAIN SCALES - GENERAL: PAINLEVEL_OUTOF10: 0

## 2024-02-20 ASSESSMENT — PAIN - FUNCTIONAL ASSESSMENT: PAIN_FUNCTIONAL_ASSESSMENT: 0-10

## 2024-02-20 ASSESSMENT — LIFESTYLE VARIABLES
HOW MANY STANDARD DRINKS CONTAINING ALCOHOL DO YOU HAVE ON A TYPICAL DAY: 7 TO 9
HOW OFTEN DO YOU HAVE A DRINK CONTAINING ALCOHOL: 4 OR MORE TIMES A WEEK

## 2024-02-20 NOTE — ED TRIAGE NOTES
Pts family called 911 thinking patient had overdosed, pt states he was just \"zoned\"  he has been up for a couple of days, he has been smoking \"crack\", He feels itchy all over.

## 2024-02-20 NOTE — ED PROVIDER NOTES
[facility] EMERGENCY DEPT  EMERGENCY DEPARTMENT ENCOUNTER      Pt Name: Schuyler Murphy  MRN: [id number]  Birthdate 1995  Date of evaluation: 2/20/2024  Provider: ELKE SMITH MD  5:11 AM    CHIEF COMPLAINT       Chief Complaint   Patient presents with    Addiction Problem     Pts family called 911 thinking patient had overdosed, pt states he was just \"zoned\"  he has been up for a couple of days, he has been smoking \"crack\", He feels itchy all over.         HISTORY OF PRESENT ILLNESS    Schuyler Murphy is a 29 y.o. male who presents to the emergency department     29-year-old male past medical history on chart of depression presents the emergency department with EMS for confusion.  It was reported that patient's family member says he was not acting normal.  Patient told EMS workers that he was itching.  He did know Jose was the president.  Patient was alert and orient x 4.  It was communicated to me that he may have used illegal drugs.  Patient is a poor historian during interview.    The history is provided by the patient, the EMS personnel and medical records. No  was used.       Nursing Notes were reviewed.    REVIEW OF SYSTEMS       Review of Systems   Unable to perform ROS: Other (Drug intoxication)       Except as noted above the remainder of the review of systems was reviewed and negative.       PAST MEDICAL HISTORY     Past Medical History:   Diagnosis Date    Depression          SURGICAL HISTORY     No past surgical history on file.      CURRENT MEDICATIONS       Previous Medications    ACETAMINOPHEN (TYLENOL) 500 MG TABLET    Take 1,000 mg by mouth every 6 hours as needed    ESCITALOPRAM (LEXAPRO) 10 MG TABLET    Take 10 mg by mouth daily    HYDROXYZINE PAMOATE (VISTARIL) 25 MG CAPSULE    Take 25 mg by mouth 3 times daily as needed    IBUPROFEN (ADVIL;MOTRIN) 600 MG TABLET    Take 600 mg by mouth every 6 hours as needed    PREDNISONE 10 MG (21) TBPK    Take 6 tablets on day 1; take 5

## 2024-02-20 NOTE — ED NOTES
Patient presented ems, family thought pt had overdosed on something because he was hard to wake up.  He verbalizes that he \"tried to get high to quick\" was smoking \"crack\" and drinking. He is awake and alert on arrival, does appear sleepy.  He states he was \"zoned out\" because it has been a few days since he has slept.

## 2024-03-17 ENCOUNTER — HOSPITAL ENCOUNTER (EMERGENCY)
Facility: HOSPITAL | Age: 29
Discharge: HOME | End: 2024-03-20
Attending: EMERGENCY MEDICINE
Payer: COMMERCIAL

## 2024-03-17 DIAGNOSIS — J03.90 ACUTE TONSILLITIS, UNSPECIFIED ETIOLOGY: ICD-10-CM

## 2024-03-17 DIAGNOSIS — F20.9 SCHIZOPHRENIA, UNSPECIFIED TYPE (HCC): ICD-10-CM

## 2024-03-17 DIAGNOSIS — J36 PERITONSILLAR ABSCESS: ICD-10-CM

## 2024-03-17 DIAGNOSIS — F14.10 COCAINE ABUSE (HCC): ICD-10-CM

## 2024-03-17 DIAGNOSIS — F10.10 ALCOHOL ABUSE: ICD-10-CM

## 2024-03-17 DIAGNOSIS — R45.851 SUICIDAL IDEATIONS: Primary | ICD-10-CM

## 2024-03-17 DIAGNOSIS — U07.1 COVID-19: ICD-10-CM

## 2024-03-17 LAB
AMPHET UR QL SCN: NEGATIVE
ANION GAP SERPL CALC-SCNC: 4 MMOL/L (ref 3–18)
APAP SERPL-MCNC: <2 UG/ML (ref 10–30)
BARBITURATES UR QL SCN: NEGATIVE
BASOPHILS # BLD: 0.1 K/UL (ref 0–0.1)
BASOPHILS NFR BLD: 1 % (ref 0–2)
BENZODIAZ UR QL: NEGATIVE
BUN SERPL-MCNC: 10 MG/DL (ref 7–18)
BUN/CREAT SERPL: 9 (ref 12–20)
CALCIUM SERPL-MCNC: 9.3 MG/DL (ref 8.5–10.1)
CANNABINOIDS UR QL SCN: POSITIVE
CHLORIDE SERPL-SCNC: 106 MMOL/L (ref 100–111)
CO2 SERPL-SCNC: 30 MMOL/L (ref 21–32)
COCAINE UR QL SCN: POSITIVE
CREAT SERPL-MCNC: 1.11 MG/DL (ref 0.6–1.3)
DIFFERENTIAL METHOD BLD: ABNORMAL
EOSINOPHIL # BLD: 0.1 K/UL (ref 0–0.4)
EOSINOPHIL NFR BLD: 1 % (ref 0–5)
ERYTHROCYTE [DISTWIDTH] IN BLOOD BY AUTOMATED COUNT: 13.8 % (ref 11.6–14.5)
ETHANOL SERPL-MCNC: <3 MG/DL (ref 0–3)
FLUAV RNA SPEC QL NAA+PROBE: NOT DETECTED
FLUBV RNA SPEC QL NAA+PROBE: NOT DETECTED
GLUCOSE SERPL-MCNC: 101 MG/DL (ref 74–99)
HCT VFR BLD AUTO: 45.3 % (ref 36–48)
HGB BLD-MCNC: 15.2 G/DL (ref 13–16)
IMM GRANULOCYTES # BLD AUTO: 0 K/UL (ref 0–0.04)
IMM GRANULOCYTES NFR BLD AUTO: 0 % (ref 0–0.5)
LYMPHOCYTES # BLD: 1.1 K/UL (ref 0.9–3.6)
LYMPHOCYTES NFR BLD: 10 % (ref 21–52)
Lab: ABNORMAL
MCH RBC QN AUTO: 30.7 PG (ref 24–34)
MCHC RBC AUTO-ENTMCNC: 33.6 G/DL (ref 31–37)
MCV RBC AUTO: 91.5 FL (ref 78–100)
METHADONE UR QL: NEGATIVE
MONOCYTES # BLD: 1.1 K/UL (ref 0.05–1.2)
MONOCYTES NFR BLD: 11 % (ref 3–10)
NEUTS SEG # BLD: 8 K/UL (ref 1.8–8)
NEUTS SEG NFR BLD: 77 % (ref 40–73)
NRBC # BLD: 0 K/UL (ref 0–0.01)
NRBC BLD-RTO: 0 PER 100 WBC
OPIATES UR QL: NEGATIVE
PCP UR QL: NEGATIVE
PLATELET # BLD AUTO: 193 K/UL (ref 135–420)
PMV BLD AUTO: 10 FL (ref 9.2–11.8)
POTASSIUM SERPL-SCNC: 3.9 MMOL/L (ref 3.5–5.5)
RBC # BLD AUTO: 4.95 M/UL (ref 4.35–5.65)
SALICYLATES SERPL-MCNC: 1.7 MG/DL (ref 2.8–20)
SARS-COV-2 RNA RESP QL NAA+PROBE: DETECTED
SODIUM SERPL-SCNC: 140 MMOL/L (ref 136–145)
WBC # BLD AUTO: 10.3 K/UL (ref 4.6–13.2)

## 2024-03-17 PROCEDURE — 80307 DRUG TEST PRSMV CHEM ANLYZR: CPT

## 2024-03-17 PROCEDURE — 80179 DRUG ASSAY SALICYLATE: CPT

## 2024-03-17 PROCEDURE — 80143 DRUG ASSAY ACETAMINOPHEN: CPT

## 2024-03-17 PROCEDURE — 6370000000 HC RX 637 (ALT 250 FOR IP): Performed by: PHYSICIAN ASSISTANT

## 2024-03-17 PROCEDURE — 6360000002 HC RX W HCPCS: Performed by: PHYSICIAN ASSISTANT

## 2024-03-17 PROCEDURE — 80048 BASIC METABOLIC PNL TOTAL CA: CPT

## 2024-03-17 PROCEDURE — 99285 EMERGENCY DEPT VISIT HI MDM: CPT

## 2024-03-17 PROCEDURE — 85025 COMPLETE CBC W/AUTO DIFF WBC: CPT

## 2024-03-17 PROCEDURE — 82077 ASSAY SPEC XCP UR&BREATH IA: CPT

## 2024-03-17 PROCEDURE — 87636 SARSCOV2 & INF A&B AMP PRB: CPT

## 2024-03-17 RX ORDER — DEXAMETHASONE SODIUM PHOSPHATE 4 MG/ML
10 INJECTION, SOLUTION INTRA-ARTICULAR; INTRALESIONAL; INTRAMUSCULAR; INTRAVENOUS; SOFT TISSUE
Status: COMPLETED | OUTPATIENT
Start: 2024-03-17 | End: 2024-03-17

## 2024-03-17 RX ORDER — ACETAMINOPHEN 500 MG
1000 TABLET ORAL
Status: COMPLETED | OUTPATIENT
Start: 2024-03-17 | End: 2024-03-17

## 2024-03-17 RX ADMIN — DEXAMETHASONE SODIUM PHOSPHATE 10 MG: 4 INJECTION INTRA-ARTICULAR; INTRALESIONAL; INTRAMUSCULAR; INTRAVENOUS; SOFT TISSUE at 22:21

## 2024-03-17 RX ADMIN — ACETAMINOPHEN 1000 MG: 500 TABLET ORAL at 22:19

## 2024-03-17 ASSESSMENT — PAIN DESCRIPTION - LOCATION
LOCATION: THROAT
LOCATION: THROAT

## 2024-03-17 ASSESSMENT — PAIN SCALES - GENERAL
PAINLEVEL_OUTOF10: 7
PAINLEVEL_OUTOF10: 7

## 2024-03-18 PROCEDURE — 94761 N-INVAS EAR/PLS OXIMETRY MLT: CPT

## 2024-03-18 PROCEDURE — 6370000000 HC RX 637 (ALT 250 FOR IP): Performed by: EMERGENCY MEDICINE

## 2024-03-18 RX ORDER — LIDOCAINE HYDROCHLORIDE 20 MG/ML
15 SOLUTION OROPHARYNGEAL
Status: COMPLETED | OUTPATIENT
Start: 2024-03-18 | End: 2024-03-18

## 2024-03-18 RX ORDER — IBUPROFEN 600 MG/1
600 TABLET ORAL
Status: COMPLETED | OUTPATIENT
Start: 2024-03-18 | End: 2024-03-18

## 2024-03-18 RX ADMIN — LIDOCAINE HYDROCHLORIDE 15 ML: 20 SOLUTION ORAL; TOPICAL at 19:44

## 2024-03-18 RX ADMIN — IBUPROFEN 600 MG: 600 TABLET, FILM COATED ORAL at 22:39

## 2024-03-18 ASSESSMENT — PAIN SCALES - GENERAL
PAINLEVEL_OUTOF10: 10
PAINLEVEL_OUTOF10: 10

## 2024-03-18 ASSESSMENT — PAIN DESCRIPTION - DESCRIPTORS: DESCRIPTORS: ACHING;THROBBING

## 2024-03-18 ASSESSMENT — PAIN DESCRIPTION - LOCATION
LOCATION: THROAT
LOCATION: THROAT

## 2024-03-18 ASSESSMENT — PAIN - FUNCTIONAL ASSESSMENT: PAIN_FUNCTIONAL_ASSESSMENT: ACTIVITIES ARE NOT PREVENTED

## 2024-03-18 NOTE — ED TRIAGE NOTES
Pt arrived via Triage from his cousin house. Pt states he is SI without a plan at this time. Has not been compliant with his medications.

## 2024-03-18 NOTE — ED NOTES
I received the patient in turnover from Dr. Martinez at the end of his shift.  The patient is a 29-year-old male with past medical history significant for depression and substance abuse, who presented to the ED with suicidal ideation.  The patient is noted to be COVID-positive.  We are awaiting crisis evaluation and placement at this time.     Snow Amezquita MD  03/19/24 4385

## 2024-03-18 NOTE — ED NOTES
Bedside shift change report given to Dionne (oncoming nurse) by April (offgoing nurse). Report included the following information Nurse Handoff Report.

## 2024-03-19 ENCOUNTER — APPOINTMENT (OUTPATIENT)
Facility: HOSPITAL | Age: 29
End: 2024-03-19
Payer: COMMERCIAL

## 2024-03-19 LAB
ANION GAP BLD CALC-SCNC: ABNORMAL MMOL/L (ref 10–20)
BASE EXCESS BLD CALC-SCNC: 1.9 MMOL/L
CA-I BLD-MCNC: 1.09 MMOL/L (ref 1.12–1.32)
CHLORIDE BLD-SCNC: 106 MMOL/L (ref 98–107)
CO2 BLD-SCNC: 26 MMOL/L (ref 19–24)
CREAT BLD-MCNC: 0.72 MG/DL (ref 0.6–1.3)
DEPRECATED S PYO AG THROAT QL EIA: NEGATIVE
FLUAV RNA SPEC QL NAA+PROBE: NOT DETECTED
FLUBV RNA SPEC QL NAA+PROBE: NOT DETECTED
GLUCOSE BLD-MCNC: 84 MG/DL (ref 65–100)
HCO3 BLD-SCNC: 27 MMOL/L (ref 22–26)
LACTATE BLD-SCNC: 1.06 MMOL/L (ref 0.4–2)
PCO2 BLDV: 43.3 MMHG (ref 41–51)
PH BLDV: 7.4 (ref 7.32–7.42)
PO2 BLDV: 42 MMHG (ref 25–40)
POTASSIUM BLD-SCNC: 4.5 MMOL/L (ref 3.5–5.1)
SAO2 % BLD: 78 %
SARS-COV-2 RNA RESP QL NAA+PROBE: DETECTED
SERVICE CMNT-IMP: ABNORMAL
SODIUM BLD-SCNC: 140 MMOL/L (ref 136–145)
SPECIMEN SITE: ABNORMAL

## 2024-03-19 PROCEDURE — 96365 THER/PROPH/DIAG IV INF INIT: CPT

## 2024-03-19 PROCEDURE — 87070 CULTURE OTHR SPECIMN AEROBIC: CPT

## 2024-03-19 PROCEDURE — 83605 ASSAY OF LACTIC ACID: CPT

## 2024-03-19 PROCEDURE — 84132 ASSAY OF SERUM POTASSIUM: CPT

## 2024-03-19 PROCEDURE — 6360000004 HC RX CONTRAST MEDICATION: Performed by: EMERGENCY MEDICINE

## 2024-03-19 PROCEDURE — 94761 N-INVAS EAR/PLS OXIMETRY MLT: CPT

## 2024-03-19 PROCEDURE — 2500000003 HC RX 250 WO HCPCS

## 2024-03-19 PROCEDURE — 6370000000 HC RX 637 (ALT 250 FOR IP): Performed by: PSYCHIATRY & NEUROLOGY

## 2024-03-19 PROCEDURE — 84295 ASSAY OF SERUM SODIUM: CPT

## 2024-03-19 PROCEDURE — 6360000002 HC RX W HCPCS: Performed by: EMERGENCY MEDICINE

## 2024-03-19 PROCEDURE — 99284 EMERGENCY DEPT VISIT MOD MDM: CPT | Performed by: PSYCHIATRY & NEUROLOGY

## 2024-03-19 PROCEDURE — 82803 BLOOD GASES ANY COMBINATION: CPT

## 2024-03-19 PROCEDURE — 6370000000 HC RX 637 (ALT 250 FOR IP)

## 2024-03-19 PROCEDURE — 82947 ASSAY GLUCOSE BLOOD QUANT: CPT

## 2024-03-19 PROCEDURE — 85014 HEMATOCRIT: CPT

## 2024-03-19 PROCEDURE — 82330 ASSAY OF CALCIUM: CPT

## 2024-03-19 PROCEDURE — 6370000000 HC RX 637 (ALT 250 FOR IP): Performed by: EMERGENCY MEDICINE

## 2024-03-19 PROCEDURE — 70491 CT SOFT TISSUE NECK W/DYE: CPT

## 2024-03-19 PROCEDURE — 87636 SARSCOV2 & INF A&B AMP PRB: CPT

## 2024-03-19 PROCEDURE — 87880 STREP A ASSAY W/OPTIC: CPT

## 2024-03-19 PROCEDURE — 96366 THER/PROPH/DIAG IV INF ADDON: CPT

## 2024-03-19 PROCEDURE — 96375 TX/PRO/DX INJ NEW DRUG ADDON: CPT

## 2024-03-19 RX ORDER — CLINDAMYCIN PHOSPHATE 600 MG/50ML
600 INJECTION, SOLUTION INTRAVENOUS EVERY 8 HOURS
Status: DISCONTINUED | OUTPATIENT
Start: 2024-03-19 | End: 2024-03-20

## 2024-03-19 RX ORDER — FLUPHENAZINE HYDROCHLORIDE 5 MG/1
10 TABLET ORAL NIGHTLY
Status: DISCONTINUED | OUTPATIENT
Start: 2024-03-19 | End: 2024-03-20 | Stop reason: HOSPADM

## 2024-03-19 RX ORDER — LIDOCAINE HYDROCHLORIDE AND EPINEPHRINE 10; 10 MG/ML; UG/ML
20 INJECTION, SOLUTION INFILTRATION; PERINEURAL
Status: COMPLETED | OUTPATIENT
Start: 2024-03-19 | End: 2024-03-19

## 2024-03-19 RX ORDER — LIDOCAINE HYDROCHLORIDE AND EPINEPHRINE 10; 10 MG/ML; UG/ML
20 INJECTION, SOLUTION INFILTRATION; PERINEURAL ONCE
Status: DISCONTINUED | OUTPATIENT
Start: 2024-03-19 | End: 2024-03-20 | Stop reason: HOSPADM

## 2024-03-19 RX ORDER — ACETAMINOPHEN 500 MG
1000 TABLET ORAL
Status: COMPLETED | OUTPATIENT
Start: 2024-03-19 | End: 2024-03-19

## 2024-03-19 RX ORDER — LIDOCAINE HYDROCHLORIDE AND EPINEPHRINE 10; 10 MG/ML; UG/ML
10 INJECTION, SOLUTION INFILTRATION; PERINEURAL
Status: DISCONTINUED | OUTPATIENT
Start: 2024-03-19 | End: 2024-03-19

## 2024-03-19 RX ORDER — KETOROLAC TROMETHAMINE 15 MG/ML
15 INJECTION, SOLUTION INTRAMUSCULAR; INTRAVENOUS ONCE
Status: COMPLETED | OUTPATIENT
Start: 2024-03-19 | End: 2024-03-19

## 2024-03-19 RX ADMIN — CLINDAMYCIN PHOSPHATE 600 MG: 150 INJECTION, SOLUTION INTRAMUSCULAR; INTRAVENOUS at 11:37

## 2024-03-19 RX ADMIN — IOPAMIDOL 80 ML: 612 INJECTION, SOLUTION INTRAVENOUS at 10:07

## 2024-03-19 RX ADMIN — FLUPHENAZINE HYDROCHLORIDE 10 MG: 5 TABLET ORAL at 22:06

## 2024-03-19 RX ADMIN — ACETAMINOPHEN 1000 MG: 500 TABLET ORAL at 11:30

## 2024-03-19 RX ADMIN — DEXAMETHASONE INTENSOL 10 MG: 1 SOLUTION, CONCENTRATE ORAL at 09:38

## 2024-03-19 RX ADMIN — BENZOCAINE AND MENTHOL 1 LOZENGE: 15; 3.6 LOZENGE ORAL at 21:10

## 2024-03-19 RX ADMIN — BENZOCAINE: 200 SPRAY DENTAL; ORAL; PERIODONTAL at 14:18

## 2024-03-19 RX ADMIN — LIDOCAINE HYDROCHLORIDE,EPINEPHRINE BITARTRATE 20 ML: 10; .01 INJECTION, SOLUTION INFILTRATION; PERINEURAL at 14:18

## 2024-03-19 RX ADMIN — BENZOCAINE AND MENTHOL 1 LOZENGE: 15; 3.6 LOZENGE ORAL at 22:29

## 2024-03-19 RX ADMIN — KETOROLAC TROMETHAMINE 15 MG: 15 INJECTION, SOLUTION INTRAMUSCULAR; INTRAVENOUS at 11:30

## 2024-03-19 RX ADMIN — CLINDAMYCIN PHOSPHATE 600 MG: 150 INJECTION, SOLUTION INTRAMUSCULAR; INTRAVENOUS at 19:35

## 2024-03-19 ASSESSMENT — PAIN SCALES - GENERAL
PAINLEVEL_OUTOF10: 7
PAINLEVEL_OUTOF10: 10
PAINLEVEL_OUTOF10: 5

## 2024-03-19 ASSESSMENT — PAIN DESCRIPTION - LOCATION
LOCATION: THROAT

## 2024-03-19 NOTE — ED NOTES
Patient laying in bed, eyes open, blankets covering up to shoulders. Calm, quiet. Sitter has patient within line of sight.

## 2024-03-19 NOTE — ED NOTES
Assumed care of pt in rm 17. Pt reports he has a sore throat that started 2 days ago. Noted to be + Covid. Pt has a very gargly voice, reports he can not swallow spit. Spoke with Dr. Kaba expressed concerns for PTA. Pt awaiting CT scan. 20 ga PIV placed to L AC by [facility] tech. Pt denies any SI thoughts or plans. Hx noted. Pt is A O x 3. Sitter at bedside, room made safe ( call bell removed). Will continue to monitor.

## 2024-03-19 NOTE — PROCEDURES
Incision/Drainage    Date/Time: 3/19/2024 2:45 PM    Performed by: Prem Servin MD  Authorized by: Christiano Kaba MD    Consent:     Consent obtained:  Verbal and written    Consent given by:  Patient    Risks, benefits, and alternatives were discussed: yes      Risks discussed:  Bleeding, incomplete drainage, damage to other organs, infection and pain    Alternatives discussed:  No treatment  Location:     Type:  Abscess    Location:  Mouth    Mouth location:  Peritonsillar  Anesthesia:     Anesthesia method:  Local infiltration and topical application    Topical anesthetic:  Benzocaine gel    Local anesthetic:  Lidocaine 1% WITH epi  Procedure details:     Drainage:  Serosanguinous    Drainage amount:  Scant    Packing materials:  None  Post-procedure details:     Procedure completion:  Tolerated  Comments:      Minimal purulent discharge appreciated

## 2024-03-19 NOTE — ED NOTES
Assumed care of patient. Patient laying right side, eyes open, sitter has patient within line of sight. Wearing blue scrubs, red socks. No blankets, no pillow present. Skin is warm and dry to touch. No respiratory distress observed.

## 2024-03-19 NOTE — ED NOTES
Assumed care of patient. Introduced myself to patient. Pt safety measures established. Pt. bed locked in lowest position. Sitter at bedside. However, pt denies any SI,HI, AH or VH.

## 2024-03-20 VITALS
BODY MASS INDEX: 20.41 KG/M2 | TEMPERATURE: 97.1 F | WEIGHT: 159 LBS | SYSTOLIC BLOOD PRESSURE: 103 MMHG | HEART RATE: 57 BPM | HEIGHT: 74 IN | OXYGEN SATURATION: 99 % | DIASTOLIC BLOOD PRESSURE: 63 MMHG | RESPIRATION RATE: 16 BRPM

## 2024-03-20 PROCEDURE — 99282 EMERGENCY DEPT VISIT SF MDM: CPT | Performed by: PSYCHIATRY & NEUROLOGY

## 2024-03-20 PROCEDURE — 6370000000 HC RX 637 (ALT 250 FOR IP): Performed by: EMERGENCY MEDICINE

## 2024-03-20 PROCEDURE — 6360000002 HC RX W HCPCS: Performed by: EMERGENCY MEDICINE

## 2024-03-20 RX ORDER — IBUPROFEN 400 MG/1
800 TABLET ORAL
Status: COMPLETED | OUTPATIENT
Start: 2024-03-20 | End: 2024-03-20

## 2024-03-20 RX ORDER — DEXAMETHASONE 4 MG/1
4 TABLET ORAL
Status: COMPLETED | OUTPATIENT
Start: 2024-03-20 | End: 2024-03-20

## 2024-03-20 RX ORDER — CLINDAMYCIN HYDROCHLORIDE 150 MG/1
300 CAPSULE ORAL EVERY 8 HOURS SCHEDULED
Status: DISCONTINUED | OUTPATIENT
Start: 2024-03-20 | End: 2024-03-20 | Stop reason: HOSPADM

## 2024-03-20 RX ORDER — CLINDAMYCIN HYDROCHLORIDE 300 MG/1
300 CAPSULE ORAL 3 TIMES DAILY
Qty: 21 CAPSULE | Refills: 0 | Status: SHIPPED | OUTPATIENT
Start: 2024-03-20 | End: 2024-03-27

## 2024-03-20 RX ADMIN — IBUPROFEN 800 MG: 400 TABLET, FILM COATED ORAL at 05:50

## 2024-03-20 RX ADMIN — CLINDAMYCIN HYDROCHLORIDE 300 MG: 150 CAPSULE ORAL at 05:49

## 2024-03-20 RX ADMIN — BENZOCAINE AND MENTHOL 1 LOZENGE: 15; 3.6 LOZENGE ORAL at 05:50

## 2024-03-20 RX ADMIN — DEXAMETHASONE 4 MG: 4 TABLET ORAL at 05:50

## 2024-03-20 RX ADMIN — CLINDAMYCIN HYDROCHLORIDE 300 MG: 150 CAPSULE ORAL at 14:08

## 2024-03-20 ASSESSMENT — PAIN - FUNCTIONAL ASSESSMENT: PAIN_FUNCTIONAL_ASSESSMENT: NONE - DENIES PAIN

## 2024-03-20 ASSESSMENT — PAIN SCALES - GENERAL: PAINLEVEL_OUTOF10: 8

## 2024-03-20 ASSESSMENT — PAIN DESCRIPTION - LOCATION: LOCATION: THROAT

## 2024-03-20 NOTE — ED NOTES
D/c paperwork reviewed with patient, patient verbalized understanding. Pt left ED ambulatory and in stable condition.  Pt request to take shower prior to leaving, sitter assisted pt to shower. Pt also request medicaid transportation. This nurse obtained his updated address and made SARY Aguillon aware.

## 2024-03-20 NOTE — ED NOTES
Patient asleep sitter at door of the room patient being observed via camera due to Covid isolation.

## 2024-03-20 NOTE — ED NOTES
Per Dr Butler's note from earlier today \"He is able to contract for safety today does not need a suicide watch\".

## 2024-03-20 NOTE — ED NOTES
I received the patient in turnover from Dr. Reid at the end of his shift.  The patient is a 29-year-old male with past medical history significant for schizophrenia, who presented to the ED for suicidal ideation.  He is also COVID-positive.  Additionally he does have a peritonsillar abscess.  He was seen by ENT.  The patient has clinically improved in the ED.  Psychiatry will evaluate today.    The patient the patient does not not meet criteria for admission.  He was evaluated by Dr. Butler who states that he is at his baseline.  The patient will be discharged home with a prescription for clindamycin.  He will be advised to follow-up with his psychiatrist as soon as possible.  Return precautions have been given.     Snow Amezquita MD  03/20/24 4500

## 2024-03-20 NOTE — ED NOTES
Patient awake and alert denies suicidal ideation at this time, given juices as requested tolerated well.

## 2024-03-20 NOTE — CONSULTS
[address]  [address]                              CONSULTATION      PATIENT NAME: ROSALIA DESHPANDE              : 1995  MED REC NO: [id number]                       ROOM: ER17  ACCOUNT NO: [id number]                       ADMIT DATE: 2024  PROVIDER: Luis Villegas MD    DATE OF SERVICE:  2024    ATTENDING PHYSICIAN:  Jun Reid MD    REASON FOR EVALUATION:  Peritonsillar abscess.    HISTORY OF PRESENT ILLNESS:  The patient is a 29-year-old male, who was being seen by me for evaluation regarding difficulties with significant throat pain and throat irritation.  The patient presented to the emergency room several days prior with difficulties with depression and substance abuse.  He was noted to have some difficulties with suicidal ideation and was noted to be cocaine positive.    The patient underwent a bedside I and D with a needle earlier today for presumed abscess.    No aspiration of any abnormalities noted.  Hence, ENT consultation now requested for evaluation regarding the above.    The patient states that he has had some difficulties with throat pain approximately 2 days duration.  He states that he may have been smoking some sort of illicit substance which may have caused some irritation in his throat.  The patient, however, is an extremely poor historian.  Hence, I am uncertain regarding the validity of any of his statements.    In any event, the patient was evaluated for the above.    The patient's white count was noted to be 10.  CT scan was obtained earlier.  CT scan was reviewed by me.  There is no evidence of any obvious abscess formation.  He does have bilateral tonsillitis with some fluid collection in the left peritonsillar area measuring 2.1 cm.  However, this does not appear to be consistent with the abscess formation.  The neck exam on CT scan reveals some cervical adenopathy as

## 2024-03-20 NOTE — ED NOTES
Patient is awake and alert in no acute distress at this time.  Remains on isolation for Covid and being treated for ash tonsillar abscess, drained and patient is now on antibiotics.

## 2024-03-20 NOTE — PROGRESS NOTES
have him set up for follow-up with his psychiatrist in the very near future.  I would also recommend that they refer him to an intensive outpatient substance abuse treatment program.  There is some concern about his current housing because he shares a bedroom with his cousin and there is not a room in the cousin's home for him to be able to go to and isolate himself from the several individuals that live there. He is still testing COVID-positive as of yesterday.  Continue the fluphenazine 10 mg at bedtime without change.    
he is safe to leave the hospital and wants to go home with his cousin.  He will be restabilized on the fluphenazine and there is a good chance that it may happen that he is no longer suicidal and  restabilized on fluphenazine, at which time that he would be able to be discharged. [facility] can [facility] followed by [facility] in about 4 more days when they can be checking on him to take him for follow-up with the psychiatrist through their program as an outpatient.  We will check on him tomorrow.

## 2024-03-20 NOTE — ED NOTES
Report was given to Kianna Givens RN for assumption of care. All questions and concerns regarding assumption of care were answered and the nurse assuming care verbalized understanding. Pt is currently in bed resting, on room air and in no acute distress. Pt denies SI, HI, AH or VH.
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
## 2024-03-20 NOTE — ED PROVIDER NOTES
3:33 AM :Pt care assumed from OG Bran , ED provider. Pt complaint(s), current treatment plan, progression and available diagnostic results have been discussed thoroughly. The patient was seen and evaluated on my shift.   Rounding occurred: No  Intended Disposition: Patient with a history of depression and polysubstance abuse, coming in with suicidal thoughts without a plan.  Found to be COVID-19 positive  Pending diagnostic reports and/or labs (please list): Awaiting crisis evaluation and placement.  Medically cleared other than the COVID-19 test which is positive.     Raymundo Martinez MD  03/18/24 0334      3/18/24 at 2100: Reassumed care of the patient, awaiting placement for suicidal ideation but is COVID-positive complicating that placement.  No acute events    3/19/24 @0613: Patient is complaining of a sore throat all night.  It has been hurting for 2 days, left greater than right.  On examination there is no trismus but there is some mild palatal asymmetry with fullness on the left but a midline uvula.  I will check a rapid strep, give Decadron and obtain a CT scan of the neck to rule out any peritonsillar abscess.  Care of the patient will be turned over to Dr. Kaba at 0630     Raymundo Martinez MD  03/19/24 0152       Raymundo Martinez MD  03/19/24 0615    
7:16 AM : Pt care assumed from Dr. Martinez  ,ED provider. History of patient complaint(s), available diagnostic reports and current treatment plan has been discussed thoroughly.   Bedside rounding on patient occured : Yes  Medically cleared No  Status: Voluntary  Intended disposition of patient : Covid pos, pending eval with crisis.  Pending diagnostics reports and/or labs (please list): follow up CT soft tissue neck    No results found for this or any previous visit (from the past 12 hour(s)).        ED Course as of 03/20/24 1704   Mon Mar 18, 2024   0608 Care of the patient turned over to Dr. Amezquita at approximately 6 AM.  Awaiting crisis assessment.  Placement will be delayed due to COVID status.  Will require psychiatric consultation [SH]   Tue Mar 19, 2024   0909 Called to evaluate patient, as the patient did not want to take his medications without came to the doctor.  Patient has a potato voice, and some mild trismus consistent with a likely developing peritonsillar abscess on the left side, his uvula is midline and his tongue is nonedematous, his airway is intact.  He does have some edema to the left peritonsillar area.  CT scan is pending, will go ahead with antibiotics as even if there is no fluid collection patient does have what appears to be tonsillitis. [LK]   1141 Discussed with Dr. Villegas, he will be available to come to drain the peritonsillar abscess sometime after 5:00 tonight. [LK]   1400 Patient notes symptomatic improvement after the therapy given so far, discussed with him his diagnosis of a peritonsillar abscess, he is amenable to bedside drainage with the emergency department to alleviate his symptoms even further. [LK]   1906 ENT is with the patient now, evaluating the PTA and the need for drainage.  Per psychiatry, the patient will be stabilized on medications but cannot be admitted to psychiatry due to COVID status.  Plan for now is to continue on antibiotics and medications based on 
Impression:   1. Suicidal ideations    2. COVID-19           Gianluca Sheriff MD  03/19/24 2052    
Impression:   1. Suicidal ideations    2. COVID-19    3. Acute tonsillitis, unspecified etiology         Jun Reid MD  03/22/24 0546    
    acetaminophen 500 MG tablet  Commonly known as: TYLENOL     escitalopram 10 MG tablet  Commonly known as: LEXAPRO     hydrOXYzine pamoate 25 MG capsule  Commonly known as: VISTARIL     ibuprofen 600 MG tablet  Commonly known as: ADVIL;MOTRIN     predniSONE 10 MG (21) Tbpk  Take 6 tablets on day 1; take 5 tablets on day 2; take 4 tablets on day 3; take 3 tablets on day 4; take 2 tablets on day 5; take 1 tablet on day 6.                Diagnosis     Clinical Impression:   1. Suicidal ideations    2. COVID-19                Mary Kate Bran PA-C  03/17/24 2250       Mary Kate Bran PA-C  03/18/24 0144

## 2024-03-21 LAB
BACTERIA SPEC CULT: NORMAL
SERVICE CMNT-IMP: NORMAL

## 2024-04-10 ENCOUNTER — HOSPITAL ENCOUNTER (EMERGENCY)
Facility: HOSPITAL | Age: 29
Discharge: HOME | End: 2024-04-11
Attending: STUDENT IN AN ORGANIZED HEALTH CARE EDUCATION/TRAINING PROGRAM
Payer: COMMERCIAL

## 2024-04-10 DIAGNOSIS — F19.10 POLYSUBSTANCE ABUSE (HCC): Primary | ICD-10-CM

## 2024-04-10 PROCEDURE — 99284 EMERGENCY DEPT VISIT MOD MDM: CPT

## 2024-04-10 PROCEDURE — 96374 THER/PROPH/DIAG INJ IV PUSH: CPT

## 2024-04-10 PROCEDURE — 6360000002 HC RX W HCPCS: Performed by: STUDENT IN AN ORGANIZED HEALTH CARE EDUCATION/TRAINING PROGRAM

## 2024-04-10 RX ORDER — NALOXONE HYDROCHLORIDE 0.4 MG/ML
0.4 INJECTION, SOLUTION INTRAMUSCULAR; INTRAVENOUS; SUBCUTANEOUS
Status: COMPLETED | OUTPATIENT
Start: 2024-04-10 | End: 2024-04-10

## 2024-04-10 RX ADMIN — NALXONE HYDROCHLORIDE 0.4 MG: 0.4 INJECTION INTRAMUSCULAR; INTRAVENOUS; SUBCUTANEOUS at 22:28

## 2024-04-11 VITALS
RESPIRATION RATE: 18 BRPM | TEMPERATURE: 98.1 F | WEIGHT: 159 LBS | OXYGEN SATURATION: 97 % | HEIGHT: 74 IN | DIASTOLIC BLOOD PRESSURE: 70 MMHG | BODY MASS INDEX: 20.41 KG/M2 | SYSTOLIC BLOOD PRESSURE: 110 MMHG | HEART RATE: 91 BPM

## 2024-04-11 RX ORDER — NALOXONE HYDROCHLORIDE 4 MG/.1ML
1 SPRAY NASAL PRN
Qty: 2 EACH | Refills: 0 | Status: SHIPPED | OUTPATIENT
Start: 2024-04-11

## 2024-04-11 NOTE — ED NOTES
Rounded on patient. Patient currently resting on stretcher with eyes closed and NAD. Respirations appear even and unlabored. Care ongoing

## 2024-04-11 NOTE — ED TRIAGE NOTES
Patient comes in by EMS for complaints of a polypharmacy overdose. Patient initially having erratic movements and given Narcan per MD order. Patient more cognizant after Narcan administration. Patient states that he found a bag, tasted it and believes it was heroin. Patient asking for ice water.

## 2024-04-28 NOTE — ED PROVIDER NOTES
[facility] EMERGENCY DEPT  EMERGENCY DEPARTMENT ENCOUNTER      Pt Name: Schuyler Murphy  MRN: [id number]  Birthdate 1995  Date of evaluation: 4/10/2024  Provider: Giselle Pino MD    CHIEF COMPLAINT       Chief Complaint   Patient presents with    Drug Overdose         HISTORY OF PRESENT ILLNESS   (Location/Symptom, Timing/Onset, Context/Setting, Quality, Duration, Modifying Factors, Severity)  Note limiting factors.   Schuyler Murphy is a 29 y.o. male who presents to the emergency department for erratic behavior.  After Narcan administered patient is able to answer questions.  Does admit to using some heroin earlier.  Denies any suicidal homicidal ideations.  Denies any physical complaints or concerns.     Nursing Notes were reviewed.    REVIEW OF SYSTEMS    (2-9 systems for level 4, 10 or more for level 5)     Constitutional: No fever  HENT: No ear pain  Eyes: No change in vision  Respiratory: No SOB  Cardio: No chest pain  GI: No blood in stool  : No hematuria  MSK: No back pain  Skin: No rashes  Neuro: No headache    Except as noted above the remainder of the review of systems was reviewed and negative.       PAST MEDICAL HISTORY     Past Medical History:   Diagnosis Date    Depression          SURGICAL HISTORY     No past surgical history on file.      CURRENT MEDICATIONS       Discharge Medication List as of 4/11/2024  5:21 AM        CONTINUE these medications which have NOT CHANGED    Details   predniSONE 10 MG (21) TBPK Take 6 tablets on day 1; take 5 tablets on day 2; take 4 tablets on day 3; take 3 tablets on day 4; take 2 tablets on day 5; take 1 tablet on day 6., Disp-21 each, R-1Normal      acetaminophen (TYLENOL) 500 MG tablet Take 1,000 mg by mouth every 6 hours as neededHistorical Med      escitalopram (LEXAPRO) 10 MG tablet Take 10 mg by mouth dailyHistorical Med      hydrOXYzine pamoate (VISTARIL) 25 MG capsule Take 25 mg by mouth 3 times daily as neededHistorical Med      ibuprofen (ADVIL;MOTRIN)

## 2024-07-22 ENCOUNTER — HOSPITAL ENCOUNTER (EMERGENCY)
Facility: HOSPITAL | Age: 29
Discharge: PSYCHIATRIC OR SUBSTANCE USE FACILITY | End: 2024-07-23
Attending: STUDENT IN AN ORGANIZED HEALTH CARE EDUCATION/TRAINING PROGRAM
Payer: COMMERCIAL

## 2024-07-22 DIAGNOSIS — F19.10 POLYSUBSTANCE ABUSE (HCC): ICD-10-CM

## 2024-07-22 DIAGNOSIS — R45.851 SUICIDAL IDEATION: Primary | ICD-10-CM

## 2024-07-22 LAB
AMPHET UR QL SCN: NEGATIVE
ANION GAP SERPL CALC-SCNC: 6 MMOL/L (ref 3–18)
BARBITURATES UR QL SCN: NEGATIVE
BASOPHILS # BLD: 0.1 K/UL (ref 0–0.1)
BASOPHILS NFR BLD: 1 % (ref 0–2)
BENZODIAZ UR QL: NEGATIVE
BUN SERPL-MCNC: 11 MG/DL (ref 7–18)
BUN/CREAT SERPL: 9 (ref 12–20)
CALCIUM SERPL-MCNC: 8.7 MG/DL (ref 8.5–10.1)
CANNABINOIDS UR QL SCN: POSITIVE
CHLORIDE SERPL-SCNC: 112 MMOL/L (ref 100–111)
CO2 SERPL-SCNC: 26 MMOL/L (ref 21–32)
COCAINE UR QL SCN: POSITIVE
CREAT SERPL-MCNC: 1.21 MG/DL (ref 0.6–1.3)
DIFFERENTIAL METHOD BLD: ABNORMAL
EOSINOPHIL # BLD: 0.2 K/UL (ref 0–0.4)
EOSINOPHIL NFR BLD: 3 % (ref 0–5)
ERYTHROCYTE [DISTWIDTH] IN BLOOD BY AUTOMATED COUNT: 13.4 % (ref 11.6–14.5)
ETHANOL SERPL-MCNC: <3 MG/DL (ref 0–3)
GLUCOSE SERPL-MCNC: 122 MG/DL (ref 74–99)
HCT VFR BLD AUTO: 41.9 % (ref 36–48)
HGB BLD-MCNC: 14 G/DL (ref 13–16)
IMM GRANULOCYTES # BLD AUTO: 0 K/UL (ref 0–0.04)
IMM GRANULOCYTES NFR BLD AUTO: 0 % (ref 0–0.5)
LYMPHOCYTES # BLD: 1.3 K/UL (ref 0.9–3.6)
LYMPHOCYTES NFR BLD: 20 % (ref 21–52)
Lab: ABNORMAL
MCH RBC QN AUTO: 30.8 PG (ref 24–34)
MCHC RBC AUTO-ENTMCNC: 33.4 G/DL (ref 31–37)
MCV RBC AUTO: 92.1 FL (ref 78–100)
METHADONE UR QL: NEGATIVE
MONOCYTES # BLD: 0.6 K/UL (ref 0.05–1.2)
MONOCYTES NFR BLD: 10 % (ref 3–10)
NEUTS SEG # BLD: 4.3 K/UL (ref 1.8–8)
NEUTS SEG NFR BLD: 66 % (ref 40–73)
NRBC # BLD: 0 K/UL (ref 0–0.01)
NRBC BLD-RTO: 0 PER 100 WBC
OPIATES UR QL: NEGATIVE
PCP UR QL: NEGATIVE
PLATELET # BLD AUTO: 224 K/UL (ref 135–420)
PMV BLD AUTO: 9.5 FL (ref 9.2–11.8)
POTASSIUM SERPL-SCNC: 3.9 MMOL/L (ref 3.5–5.5)
RBC # BLD AUTO: 4.55 M/UL (ref 4.35–5.65)
SODIUM SERPL-SCNC: 144 MMOL/L (ref 136–145)
WBC # BLD AUTO: 6.4 K/UL (ref 4.6–13.2)

## 2024-07-22 PROCEDURE — 85025 COMPLETE CBC W/AUTO DIFF WBC: CPT

## 2024-07-22 PROCEDURE — 82077 ASSAY SPEC XCP UR&BREATH IA: CPT

## 2024-07-22 PROCEDURE — 80048 BASIC METABOLIC PNL TOTAL CA: CPT

## 2024-07-22 PROCEDURE — 90791 PSYCH DIAGNOSTIC EVALUATION: CPT | Performed by: SOCIAL WORKER

## 2024-07-22 PROCEDURE — 99285 EMERGENCY DEPT VISIT HI MDM: CPT

## 2024-07-22 PROCEDURE — 80307 DRUG TEST PRSMV CHEM ANLYZR: CPT

## 2024-07-22 PROCEDURE — 87636 SARSCOV2 & INF A&B AMP PRB: CPT
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
## 2024-07-22 NOTE — ED PROVIDER NOTES
EMERGENCY DEPARTMENT HISTORY AND PHYSICAL EXAM      Date: 7/22/2024  Patient Name: Schuyler Murphy    History of Presenting Illness     Chief Complaint   Patient presents with    Suicidal       History (Context): Schuyler Murphy is a 29 y.o. male  presents to the ED today with chief complaint of suicidal ideation with plan.  Patient states symptoms began over the past 2 to 3 days.  Denies any inciting event as etiology of his symptoms.  States plan would be to jump out into traffic.  Denies any homicidal ideation or hallucinations.  Otherwise states he feels well physically speaking.      PCP: No primary care provider on file.    No current facility-administered medications for this encounter.     Current Outpatient Medications   Medication Sig Dispense Refill    naloxone (NARCAN) 4 MG/0.1ML LIQD nasal spray 1 spray by Nasal route as needed for Opioid Reversal 2 each 0    predniSONE 10 MG (21) TBPK Take 6 tablets on day 1; take 5 tablets on day 2; take 4 tablets on day 3; take 3 tablets on day 4; take 2 tablets on day 5; take 1 tablet on day 6. 21 each 1    acetaminophen (TYLENOL) 500 MG tablet Take 1,000 mg by mouth every 6 hours as needed      escitalopram (LEXAPRO) 10 MG tablet Take 10 mg by mouth daily      hydrOXYzine pamoate (VISTARIL) 25 MG capsule Take 25 mg by mouth 3 times daily as needed      ibuprofen (ADVIL;MOTRIN) 600 MG tablet Take 600 mg by mouth every 6 hours as needed         Past History     Past Medical History:   Past Medical History:   Diagnosis Date    Depression        Past Surgical History:  No past surgical history on file.    Family History:  No family history on file.    Social History:   Social History     Tobacco Use    Smoking status: Never    Smokeless tobacco: Never   Substance Use Topics    Alcohol use: No       Allergies:  No Known Allergies      Physical Exam     Vitals:    07/22/24 1213 07/22/24 1219   BP: 129/83 122/84   Pulse: 68 67   Resp: 18 18   Temp: 98.2 °F (36.8 °C)

## 2024-07-22 NOTE — ED NOTES
Bedside and Verbal shift change report given to Helen (oncoming nurse) by Deng (offgoing nurse). Report included the following information Nurse Handoff Report, Index, ED Encounter Summary, ED SBAR, and MAR.

## 2024-07-23 VITALS
SYSTOLIC BLOOD PRESSURE: 114 MMHG | RESPIRATION RATE: 18 BRPM | BODY MASS INDEX: 21.3 KG/M2 | DIASTOLIC BLOOD PRESSURE: 70 MMHG | OXYGEN SATURATION: 100 % | TEMPERATURE: 97.4 F | HEIGHT: 74 IN | HEART RATE: 57 BPM | WEIGHT: 166 LBS

## 2024-07-23 LAB
FLUAV RNA SPEC QL NAA+PROBE: NOT DETECTED
FLUBV RNA SPEC QL NAA+PROBE: NOT DETECTED
SARS-COV-2 RNA RESP QL NAA+PROBE: NOT DETECTED

## 2024-07-23 PROCEDURE — 94761 N-INVAS EAR/PLS OXIMETRY MLT: CPT

## 2024-07-23 PROCEDURE — 6370000000 HC RX 637 (ALT 250 FOR IP): Performed by: STUDENT IN AN ORGANIZED HEALTH CARE EDUCATION/TRAINING PROGRAM

## 2024-07-23 RX ORDER — NAPROXEN 250 MG/1
500 TABLET ORAL
Status: COMPLETED | OUTPATIENT
Start: 2024-07-23 | End: 2024-07-23

## 2024-07-23 RX ADMIN — NAPROXEN 500 MG: 250 TABLET ORAL at 10:20

## 2024-07-23 NOTE — BSMART NOTE
Crisis Note: Corwin Holcomb, [phone], made aware that patient's COVID results are not detected; verbalized understanding.    7/23/24 @ 12:25 am  Corwin Holcomb, also informed writer that patient has been accepted at [facility] for inpatient  services.     Accepting Physician: Dr. Jaime Correia    Number to call report: [address] [place], [phone], ext: [phone]    Zhang also informed this writer that medical transportation will be arranged to carry patient from [facility]-ED to [facility]; awaiting ETA.    7/23/24 @ 12:55 am  Corwin Schneider, notified this writer that [facility] will provide transportation for patient from [facility]ED to [facility]. ETA is 7/23/24 @ 10:00 am. Dr. Dunbar, ED-MD, made aware of disposition plans.

## 2024-07-23 NOTE — ED NOTES
Pt is currently asleep on the ED stretcher in [facility]. Bed is at the lowest position with brakes on. Sitter is at bedside.

## 2024-07-23 NOTE — BSMART NOTE
Crisis Note: On rounds, patient is alert and oriented x 4, patient verbalized that he still has suicidal thoughts. Bed search is still in progress.    Pending   [facility]      [facility]   Denial   [facility]

## 2024-08-17 ENCOUNTER — HOSPITAL ENCOUNTER (EMERGENCY)
Facility: HOSPITAL | Age: 29
Discharge: PSYCHIATRIC OR SUBSTANCE USE FACILITY | End: 2024-08-19
Attending: EMERGENCY MEDICINE
Payer: COMMERCIAL

## 2024-08-17 DIAGNOSIS — F20.9 SCHIZOPHRENIA, UNSPECIFIED TYPE (HCC): ICD-10-CM

## 2024-08-17 DIAGNOSIS — R44.3 HALLUCINATIONS: ICD-10-CM

## 2024-08-17 DIAGNOSIS — F19.10 POLYSUBSTANCE ABUSE (HCC): ICD-10-CM

## 2024-08-17 DIAGNOSIS — R45.851 SUICIDAL IDEATION: Primary | ICD-10-CM

## 2024-08-17 LAB
AMPHET UR QL SCN: NEGATIVE
ANION GAP SERPL CALC-SCNC: 8 MMOL/L (ref 3–18)
APAP SERPL-MCNC: <2 UG/ML (ref 10–30)
BARBITURATES UR QL SCN: NEGATIVE
BASOPHILS # BLD: 0 K/UL (ref 0–0.1)
BASOPHILS NFR BLD: 1 % (ref 0–2)
BENZODIAZ UR QL: NEGATIVE
BUN SERPL-MCNC: 11 MG/DL (ref 7–18)
BUN/CREAT SERPL: 11 (ref 12–20)
CALCIUM SERPL-MCNC: 9.1 MG/DL (ref 8.5–10.1)
CANNABINOIDS UR QL SCN: POSITIVE
CHLORIDE SERPL-SCNC: 110 MMOL/L (ref 100–111)
CO2 SERPL-SCNC: 23 MMOL/L (ref 21–32)
COCAINE UR QL SCN: POSITIVE
CREAT SERPL-MCNC: 1.03 MG/DL (ref 0.6–1.3)
DIFFERENTIAL METHOD BLD: NORMAL
EOSINOPHIL # BLD: 0.2 K/UL (ref 0–0.4)
EOSINOPHIL NFR BLD: 3 % (ref 0–5)
ERYTHROCYTE [DISTWIDTH] IN BLOOD BY AUTOMATED COUNT: 13.3 % (ref 11.6–14.5)
ETHANOL SERPL-MCNC: 97 MG/DL (ref 0–3)
GLUCOSE SERPL-MCNC: 93 MG/DL (ref 74–99)
HCT VFR BLD AUTO: 43.1 % (ref 36–48)
HGB BLD-MCNC: 14.5 G/DL (ref 13–16)
IMM GRANULOCYTES # BLD AUTO: 0 K/UL (ref 0–0.04)
IMM GRANULOCYTES NFR BLD AUTO: 0 % (ref 0–0.5)
LYMPHOCYTES # BLD: 1.9 K/UL (ref 0.9–3.6)
LYMPHOCYTES NFR BLD: 30 % (ref 21–52)
Lab: ABNORMAL
MCH RBC QN AUTO: 30.2 PG (ref 24–34)
MCHC RBC AUTO-ENTMCNC: 33.6 G/DL (ref 31–37)
MCV RBC AUTO: 89.8 FL (ref 78–100)
METHADONE UR QL: NEGATIVE
MONOCYTES # BLD: 0.5 K/UL (ref 0.05–1.2)
MONOCYTES NFR BLD: 8 % (ref 3–10)
NEUTS SEG # BLD: 3.7 K/UL (ref 1.8–8)
NEUTS SEG NFR BLD: 59 % (ref 40–73)
NRBC # BLD: 0 K/UL (ref 0–0.01)
NRBC BLD-RTO: 0 PER 100 WBC
OPIATES UR QL: NEGATIVE
PCP UR QL: NEGATIVE
PLATELET # BLD AUTO: 236 K/UL (ref 135–420)
PMV BLD AUTO: 9.3 FL (ref 9.2–11.8)
POTASSIUM SERPL-SCNC: 3.7 MMOL/L (ref 3.5–5.5)
RBC # BLD AUTO: 4.8 M/UL (ref 4.35–5.65)
SALICYLATES SERPL-MCNC: 2 MG/DL (ref 2.8–20)
SODIUM SERPL-SCNC: 141 MMOL/L (ref 136–145)
WBC # BLD AUTO: 6.3 K/UL (ref 4.6–13.2)

## 2024-08-17 PROCEDURE — 99285 EMERGENCY DEPT VISIT HI MDM: CPT

## 2024-08-17 PROCEDURE — 80307 DRUG TEST PRSMV CHEM ANLYZR: CPT

## 2024-08-17 PROCEDURE — 80048 BASIC METABOLIC PNL TOTAL CA: CPT

## 2024-08-17 PROCEDURE — 85025 COMPLETE CBC W/AUTO DIFF WBC: CPT

## 2024-08-17 PROCEDURE — 82077 ASSAY SPEC XCP UR&BREATH IA: CPT

## 2024-08-17 PROCEDURE — 80179 DRUG ASSAY SALICYLATE: CPT

## 2024-08-17 PROCEDURE — 80143 DRUG ASSAY ACETAMINOPHEN: CPT

## 2024-08-18 PROCEDURE — 94761 N-INVAS EAR/PLS OXIMETRY MLT: CPT

## 2024-08-18 PROCEDURE — 90791 PSYCH DIAGNOSTIC EVALUATION: CPT | Performed by: SOCIAL WORKER

## 2024-08-18 NOTE — VIRTUAL HEALTH
Schuyler Murphy, was evaluated through a synchronous (real-time) audio-video encounter. The patient (and/or guardian if applicable) is aware that this is a billable service, which includes applicable co-pays. This virtual visit was conducted with patient's (and/or legal guardian's) consent. Patient identification was verified, and a caregiver was present when appropriate.  The patient was located at Facility (Appt Department): [facility] EMERGENCY DEPT  [address] [address]  Loc: [phone]  The provider was located at Home (City/State): [place], [place]  Confirm you are appropriately licensed, registered, or certified to deliver care in the state where the patient is located as indicated above. If you are not or unsure, please re-schedule the visit: Yes, I confirm.   [place] Consult to Tele-Psych  Consult performed by: Snow Amezquita MD  Consult ordered by: Amanda Warner APRN - NP  Reason for consult: Psych evaluation      .Schuyler Murphy  [id number]  1995     Social Work Behavioral Health Crisis Assessment    08/18/24    Chief Complaint: I took 60 pills 3 days ago every 30 minutes.    HPI: Patient is a 29 y.o. Black /  male who presents for suicidal ideation and auditory hallucination. He has PMH of schizophrenia, and polysubstance use. Patient presented to the ED on 08/18/24 from his home [facility] per chart review. However, the patient reported that he walked to the the hospital. Patient reported that he had cocaine and alcohol earlier in the day and that he would either \"drug himself or get hit by a car,\" in order to kill himself. He endorsed auditory hallucinations occurring off and on indicating that \"he has repeated thoughts like a broken record.\" He was recently admitted to the hospital for SI and desire for [facility] rehab in 7/2024, but does not remember what happened when he was D/C.    Past Psychiatric History:  Previous Diagnoses/symptoms: Schizophrenia,

## 2024-08-18 NOTE — ED TRIAGE NOTES
Pt arrived via Triage, dropped off by [facility]. Pt initially stated to registration that he was gong to kill himself by jumping off a roof. Pt told me he took about 50 pills but doesn't know what he took. States he did Cocaine twice today and has been drinking.

## 2024-08-18 NOTE — ED PROVIDER NOTES
The patient is medically cleared for psychiatric evaluation and inpatient psychiatric admission.     Snow Amezquita MD  08/18/24 0609       Snow Amezquita MD  08/18/24 2347    
anemia   BMP relatively stable   ethanol level elevated 97  acetaminophen level negative   salicylate acid negative    Urine drug screen positive for cocaine and THC    MEDICATIONS ADMINISTERED IN THE ED:  Medications - No data to display    ED Course as of 08/18/24 0324   Sun Aug 18, 2024   0220 Telepsych recommends admission. [JR]      ED Course User Index  [JR] Amanda Warner APRN - NP         PROGRESS NOTE:  3:24 AM   Patient care will be transferred to Dr Amezquita.  Discussed available diagnostic results and care plan at length.  Pending sobriety, repeat ETOH level. crisis for bed placement once medically cleared.  Written by Amanda Warner NP-C         Diagnosis     Clinical Impression:   1. Suicidal ideation    2. Schizophrenia, unspecified type (HCC)    3. Hallucinations    4. Polysubstance abuse (HCC)        Disposition: TBD      No follow-up provider specified.        Medication List        ASK your doctor about these medications      acetaminophen 500 MG tablet  Commonly known as: TYLENOL     escitalopram 10 MG tablet  Commonly known as: LEXAPRO     hydrOXYzine pamoate 25 MG capsule  Commonly known as: VISTARIL     ibuprofen 600 MG tablet  Commonly known as: ADVIL;MOTRIN     naloxone 4 MG/0.1ML Liqd nasal spray  Commonly known as: Narcan  1 spray by Nasal route as needed for Opioid Reversal     predniSONE 10 MG (21) Tbpk  Take 6 tablets on day 1; take 5 tablets on day 2; take 4 tablets on day 3; take 3 tablets on day 4; take 2 tablets on day 5; take 1 tablet on day 6.               Dictation disclaimer:  Please note that this dictation was completed with [email or web address], the computer voice recognition software.  Quite often unanticipated grammatical, syntax, homophones, and other interpretive errors are inadvertently transcribed by the computer software.  Please disregard these errors.  Please excuse any errors that have escaped final proofreading.        Amanda Warner NP (electronically signed)  Emergency

## 2024-08-18 NOTE — ED NOTES
I received the patient in turnover from Dr. Reid at the end of his shift.  The patient is a 29-year-old male with past medical history significant for schizoaffective disorder and substance abuse, who presented to the ED with suicidal ideation.  At this time he is awaiting placement for crisis.     Snow Amezquita MD  08/18/24 4480

## 2024-08-18 NOTE — ED NOTES
Patient is awake and alert being seen for suicidal ideation sitter at bedside changed out into paper scrubs environment secured.

## 2024-08-19 VITALS
WEIGHT: 175 LBS | HEART RATE: 64 BPM | HEIGHT: 74 IN | BODY MASS INDEX: 22.46 KG/M2 | OXYGEN SATURATION: 100 % | DIASTOLIC BLOOD PRESSURE: 76 MMHG | TEMPERATURE: 97.2 F | RESPIRATION RATE: 18 BRPM | SYSTOLIC BLOOD PRESSURE: 118 MMHG

## 2024-08-19 ASSESSMENT — PAIN DESCRIPTION - LOCATION: LOCATION: TEETH

## 2024-08-19 ASSESSMENT — PAIN DESCRIPTION - ORIENTATION: ORIENTATION: LEFT;UPPER;LOWER

## 2024-08-19 ASSESSMENT — PAIN SCALES - GENERAL: PAINLEVEL_OUTOF10: 8

## 2024-08-19 ASSESSMENT — PAIN - FUNCTIONAL ASSESSMENT: PAIN_FUNCTIONAL_ASSESSMENT: 0-10
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
## 2024-08-19 NOTE — ED NOTES
Assumed care of patient, patient sleeping on stretcher. Will continue to monitor.   [FreeTextEntry1] : Musculoskeletal \par osteoporosis - continue Prolia 60mg/mL subcutaneous injections every 6 months\par chronic back/neck pain - secondary to spinal stenosis - continue Lyrica (Pregabalin) 75mg TID p.o.q.d., Rx filled,  reviewed and Oxycodone HCl p.o. p.r.n. as directed, Rx filled,  reviewed \par Psychiatry\par continue Venlafaxine HCl 25mg TID p.o.q.d. as directed \par Urology\par chronic UTIs - continue Methenamine Hippurate 1 GM p.o.q.d. as directed and OTC Cranberry 405mg BID p.o.q.d.\par incontinence/overactive bladder - continue Trospium Chloride ER 60mg p.o.q.d

## 2024-08-19 NOTE — ED NOTES
Patient sleeping, easily aroused. Vitals updated. Patient requests nothing at this time. Sitter at bedside.

## 2024-11-09 ENCOUNTER — HOSPITAL ENCOUNTER (EMERGENCY)
Facility: HOSPITAL | Age: 29
Discharge: NURSING, REHAB OR LONG-TERM CARE | End: 2024-11-10
Attending: EMERGENCY MEDICINE
Payer: COMMERCIAL

## 2024-11-09 DIAGNOSIS — F32.A DEPRESSION, UNSPECIFIED DEPRESSION TYPE: Primary | ICD-10-CM

## 2024-11-09 LAB
ALBUMIN SERPL-MCNC: 3.8 G/DL (ref 3.4–5)
ALBUMIN/GLOB SERPL: 1.2 (ref 0.8–1.7)
ALP SERPL-CCNC: 72 U/L (ref 45–117)
ALT SERPL-CCNC: 30 U/L (ref 16–61)
AMPHET UR QL SCN: NEGATIVE
ANION GAP SERPL CALC-SCNC: 1 MMOL/L (ref 3–18)
AST SERPL-CCNC: 29 U/L (ref 10–38)
BARBITURATES UR QL SCN: NEGATIVE
BASOPHILS # BLD: 0.1 K/UL (ref 0–0.1)
BASOPHILS NFR BLD: 1 % (ref 0–2)
BENZODIAZ UR QL: NEGATIVE
BILIRUB SERPL-MCNC: 0.4 MG/DL (ref 0.2–1)
BUN SERPL-MCNC: 12 MG/DL (ref 7–18)
BUN/CREAT SERPL: 9 (ref 12–20)
CALCIUM SERPL-MCNC: 9.3 MG/DL (ref 8.5–10.1)
CANNABINOIDS UR QL SCN: POSITIVE
CHLORIDE SERPL-SCNC: 111 MMOL/L (ref 100–111)
CO2 SERPL-SCNC: 27 MMOL/L (ref 21–32)
COCAINE UR QL SCN: POSITIVE
CREAT SERPL-MCNC: 1.29 MG/DL (ref 0.6–1.3)
DIFFERENTIAL METHOD BLD: NORMAL
EOSINOPHIL # BLD: 0.2 K/UL (ref 0–0.4)
EOSINOPHIL NFR BLD: 3 % (ref 0–5)
ERYTHROCYTE [DISTWIDTH] IN BLOOD BY AUTOMATED COUNT: 13.3 % (ref 11.6–14.5)
ETHANOL SERPL-MCNC: <3 MG/DL (ref 0–3)
GLOBULIN SER CALC-MCNC: 3.1 G/DL (ref 2–4)
GLUCOSE SERPL-MCNC: 105 MG/DL (ref 74–99)
HCT VFR BLD AUTO: 42.5 % (ref 36–48)
HGB BLD-MCNC: 14.2 G/DL (ref 13–16)
IMM GRANULOCYTES # BLD AUTO: 0 K/UL (ref 0–0.04)
IMM GRANULOCYTES NFR BLD AUTO: 0 % (ref 0–0.5)
LYMPHOCYTES # BLD: 1.5 K/UL (ref 0.9–3.6)
LYMPHOCYTES NFR BLD: 21 % (ref 21–52)
Lab: ABNORMAL
MCH RBC QN AUTO: 30.3 PG (ref 24–34)
MCHC RBC AUTO-ENTMCNC: 33.4 G/DL (ref 31–37)
MCV RBC AUTO: 90.6 FL (ref 78–100)
METHADONE UR QL: NEGATIVE
MONOCYTES # BLD: 0.6 K/UL (ref 0.05–1.2)
MONOCYTES NFR BLD: 8 % (ref 3–10)
NEUTS SEG # BLD: 4.7 K/UL (ref 1.8–8)
NEUTS SEG NFR BLD: 67 % (ref 40–73)
NRBC # BLD: 0 K/UL (ref 0–0.01)
NRBC BLD-RTO: 0 PER 100 WBC
OPIATES UR QL: NEGATIVE
PCP UR QL: NEGATIVE
PLATELET # BLD AUTO: 229 K/UL (ref 135–420)
PMV BLD AUTO: 10.2 FL (ref 9.2–11.8)
POTASSIUM SERPL-SCNC: 3.9 MMOL/L (ref 3.5–5.5)
PROT SERPL-MCNC: 6.9 G/DL (ref 6.4–8.2)
RBC # BLD AUTO: 4.69 M/UL (ref 4.35–5.65)
SODIUM SERPL-SCNC: 139 MMOL/L (ref 136–145)
WBC # BLD AUTO: 7 K/UL (ref 4.6–13.2)

## 2024-11-09 PROCEDURE — 99285 EMERGENCY DEPT VISIT HI MDM: CPT

## 2024-11-09 PROCEDURE — 80307 DRUG TEST PRSMV CHEM ANLYZR: CPT

## 2024-11-09 PROCEDURE — 85025 COMPLETE CBC W/AUTO DIFF WBC: CPT

## 2024-11-09 PROCEDURE — 90791 PSYCH DIAGNOSTIC EVALUATION: CPT | Performed by: SOCIAL WORKER

## 2024-11-09 PROCEDURE — 80053 COMPREHEN METABOLIC PANEL: CPT

## 2024-11-09 PROCEDURE — 82077 ASSAY SPEC XCP UR&BREATH IA: CPT

## 2024-11-09 ASSESSMENT — PAIN - FUNCTIONAL ASSESSMENT: PAIN_FUNCTIONAL_ASSESSMENT: NONE - DENIES PAIN
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
## 2024-11-09 NOTE — ED PROVIDER NOTES
6:28 PM :Pt care assumed from Dr. Chung , ED provider. Pt complaint(s), current treatment plan, progression and available diagnostic results have been discussed thoroughly. The patient was seen and evaluated on my shift.   Rounding occurred: No  Intended Disposition: unsure  Pending diagnostic reports and/or labs (please list): none        Morgan Vazquez MD  11/09/24 0716    Patient is suicidal I received him in signout.  Patient stated he wanted to walk in front of a car yesterday and will do it today.  Telepsych is recommending inpatient admission will contact crisis [facility] is.    The patient is medically cleared.     Morgan Vazquez MD  11/09/24 7506

## 2024-11-09 NOTE — ED NOTES
This tech and  changed patient out and checked for items   All pts are located in locker 7 - tv/kyler system/3 personal bags

## 2024-11-09 NOTE — ED TRIAGE NOTES
Pt arrived via EMS. Pt c/o SI with a plan to take all his medications. Pt denies HI. Pt cooperative in Triage.     Pt denies any COVID symptoms.
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
## 2024-11-09 NOTE — VIRTUAL HEALTH
Reason for Cancel: TelePsych Reason for Cancel: Unable to reach onsite staff to arrange camera after multiple phone call attempts. Please re-consult when onsite staff is available to assist with camera.  When attempted to call to set up [employer] at 075 nurse stated \"right in the middle of shift change when I can do that right now you can have to call back\".    Schuyler Murphy, was evaluated through a synchronous (real-time) audio-video encounter. The patient (and/or guardian if applicable) is aware that this is a billable service, which includes applicable co-pays. This virtual visit was conducted with patient's (and/or legal guardian's) consent. Patient identification was verified, and a caregiver was present when appropriate.  The patient was located at Facility (Appt Department): [facility] EMERGENCY DEPT  [address] [address]  Loc: [phone]  The provider was located at Home (City/State): [place]  Confirm you are appropriately licensed, registered, or certified to deliver care in the state where the patient is located as indicated above. If you are not or unsure, please re-schedule the visit: Yes, I confirm.   [place] Consult to Tele-Psych  Consult performed by: Sydney Hager APRN - CNP  Consult ordered by: Bran Gannon MD  Reason for consult: Psychiatric evaluation           Total time spent on this encounter: Not billed by time    --ANNIA Lopez CNP on 11/9/2024 at 7:21 AM    An electronic signature was used to authenticate this note.

## 2024-11-10 VITALS
HEART RATE: 76 BPM | OXYGEN SATURATION: 99 % | BODY MASS INDEX: 23.74 KG/M2 | TEMPERATURE: 98.1 F | DIASTOLIC BLOOD PRESSURE: 83 MMHG | WEIGHT: 185 LBS | HEIGHT: 74 IN | RESPIRATION RATE: 18 BRPM | SYSTOLIC BLOOD PRESSURE: 123 MMHG

## 2024-11-10 PROCEDURE — 94761 N-INVAS EAR/PLS OXIMETRY MLT: CPT

## 2024-11-10 NOTE — BSMART NOTE
Chief Complaint   Patient presents with    Mental Health Problem     Patient was evaluated by Tele-Psych who recommends inpatient psychiatric treatment for  \"I walked in front of a car.\"  Patient verbalized that he walked in front of a car to cause harm towards himself.    Patient is voluntary for inpatient treatment.    Past Medical History:   Diagnosis Date    ADHD     Bipolar affective (HCC)     Depression     Polysubstance abuse (HCC)     Schizophrenia (HCC)     Suicide attempt (HCC)     TBI (traumatic brain injury)      Prior to Visit Medications    Medication Sig Taking? Authorizing Provider   naloxone (NARCAN) 4 MG/0.1ML LIQD nasal spray 1 spray by Nasal route as needed for Opioid Reversal  Giselle Pino MD   predniSONE 10 MG (21) TBPK Take 6 tablets on day 1; take 5 tablets on day 2; take 4 tablets on day 3; take 3 tablets on day 4; take 2 tablets on day 5; take 1 tablet on day 6.  Mary Ann Calix PA   acetaminophen (TYLENOL) 500 MG tablet Take 1,000 mg by mouth every 6 hours as needed  Automatic Reconciliation, Ar   escitalopram (LEXAPRO) 10 MG tablet Take 10 mg by mouth daily  Automatic Reconciliation, Ar   hydrOXYzine pamoate (VISTARIL) 25 MG capsule Take 25 mg by mouth 3 times daily as needed  Automatic Reconciliation, Ar   ibuprofen (ADVIL;MOTRIN) 600 MG tablet Take 600 mg by mouth every 6 hours as needed  Automatic Reconciliation, Ar     The following labs and vitals were reviewed with on-call psychiatrist.    CMP, ETOH, UDS, and Travel Screen.    Vitals:    11/09/24 1945   BP: 106/72   Pulse: 58   Resp: 16   Temp: 97.8 °F (36.6 °C)   SpO2: 100%     Writer met with patient to assess the following    Ambulation - Patient reports ability to ambulate without difficulty or the use of any assistive devices. and Patient denies any recent falls in the past three months .     ADLs - Patient able to perform own ADLs without assistance.    DME - Patient requires none.    Disposition: Discussed with Dr. Vazquez,

## 2024-11-10 NOTE — BSMART NOTE
Crisis Note: Corwin Sotomayor, [phone], informed this writer that patient has been accepted for  services at [facility].    Accepting Physician: Dr. Avila Workman     Number to call report: [phone]    [facility] ETA is 8:00 am.    Dr. Gannon made aware of disposition plans.

## 2024-11-10 NOTE — BSMART NOTE
Crisis Note: Conduit bed search update: will assist as needed.    Pending:   [facility]   [facility]      [facility]  [facility].

## 2024-11-10 NOTE — ED NOTES
6:46 AM :Pt care assumed from Dr. Vazquez  , ED provider. Pt complaint(s), current treatment plan, progression and available diagnostic results have been discussed thoroughly. The patient was seen and evaluated on my shift.   Rounding occurred: Yes  Intended Disposition: TBD  Pending diagnostic reports and/or labs (please list): Voluntary, bed search        Bran Gannon MD  11/10/24 0664

## 2024-11-10 NOTE — ED NOTES
Patient sitting up on edge of stretcher, eating dinner tray. Patient has no complaints at this time.

## 2024-11-17 ENCOUNTER — HOSPITAL ENCOUNTER (INPATIENT)
Facility: HOSPITAL | Age: 29
LOS: 2 days | Discharge: HOME | DRG: 750 | End: 2024-11-20
Attending: STUDENT IN AN ORGANIZED HEALTH CARE EDUCATION/TRAINING PROGRAM | Admitting: PSYCHIATRY & NEUROLOGY
Payer: COMMERCIAL

## 2024-11-17 DIAGNOSIS — R45.851 SUICIDAL IDEATION: Primary | ICD-10-CM

## 2024-11-17 LAB
AMPHET UR QL SCN: NEGATIVE
ANION GAP SERPL CALC-SCNC: 3 MMOL/L (ref 3–18)
BARBITURATES UR QL SCN: NEGATIVE
BASOPHILS # BLD: 0 K/UL (ref 0–0.1)
BASOPHILS NFR BLD: 0 % (ref 0–2)
BENZODIAZ UR QL: NEGATIVE
BUN SERPL-MCNC: 12 MG/DL (ref 7–18)
BUN/CREAT SERPL: 10 (ref 12–20)
CALCIUM SERPL-MCNC: 9.4 MG/DL (ref 8.5–10.1)
CANNABINOIDS UR QL SCN: POSITIVE
CHLORIDE SERPL-SCNC: 103 MMOL/L (ref 100–111)
CO2 SERPL-SCNC: 28 MMOL/L (ref 21–32)
COCAINE UR QL SCN: POSITIVE
CREAT SERPL-MCNC: 1.2 MG/DL (ref 0.6–1.3)
DIFFERENTIAL METHOD BLD: ABNORMAL
EOSINOPHIL # BLD: 0.1 K/UL (ref 0–0.4)
EOSINOPHIL NFR BLD: 1 % (ref 0–5)
ERYTHROCYTE [DISTWIDTH] IN BLOOD BY AUTOMATED COUNT: 12.9 % (ref 11.6–14.5)
ETHANOL SERPL-MCNC: <3 MG/DL (ref 0–3)
GLUCOSE SERPL-MCNC: 98 MG/DL (ref 74–99)
HCT VFR BLD AUTO: 45 % (ref 36–48)
HGB BLD-MCNC: 15.1 G/DL (ref 13–16)
IMM GRANULOCYTES # BLD AUTO: 0 K/UL (ref 0–0.04)
IMM GRANULOCYTES NFR BLD AUTO: 0 % (ref 0–0.5)
LYMPHOCYTES # BLD: 1.9 K/UL (ref 0.9–3.6)
LYMPHOCYTES NFR BLD: 21 % (ref 21–52)
Lab: ABNORMAL
MCH RBC QN AUTO: 30.3 PG (ref 24–34)
MCHC RBC AUTO-ENTMCNC: 33.6 G/DL (ref 31–37)
MCV RBC AUTO: 90.2 FL (ref 78–100)
METHADONE UR QL: NEGATIVE
MONOCYTES # BLD: 1 K/UL (ref 0.05–1.2)
MONOCYTES NFR BLD: 11 % (ref 3–10)
NEUTS SEG # BLD: 6.2 K/UL (ref 1.8–8)
NEUTS SEG NFR BLD: 67 % (ref 40–73)
NRBC # BLD: 0 K/UL (ref 0–0.01)
NRBC BLD-RTO: 0 PER 100 WBC
OPIATES UR QL: NEGATIVE
PCP UR QL: NEGATIVE
PLATELET # BLD AUTO: 220 K/UL (ref 135–420)
PMV BLD AUTO: 9.7 FL (ref 9.2–11.8)
POTASSIUM SERPL-SCNC: 4.2 MMOL/L (ref 3.5–5.5)
RBC # BLD AUTO: 4.99 M/UL (ref 4.35–5.65)
SODIUM SERPL-SCNC: 134 MMOL/L (ref 136–145)
WBC # BLD AUTO: 9.3 K/UL (ref 4.6–13.2)

## 2024-11-17 PROCEDURE — 85025 COMPLETE CBC W/AUTO DIFF WBC: CPT

## 2024-11-17 PROCEDURE — 82077 ASSAY SPEC XCP UR&BREATH IA: CPT

## 2024-11-17 PROCEDURE — 90792 PSYCH DIAG EVAL W/MED SRVCS: CPT

## 2024-11-17 PROCEDURE — 93005 ELECTROCARDIOGRAM TRACING: CPT | Performed by: STUDENT IN AN ORGANIZED HEALTH CARE EDUCATION/TRAINING PROGRAM

## 2024-11-17 PROCEDURE — 80307 DRUG TEST PRSMV CHEM ANLYZR: CPT

## 2024-11-17 PROCEDURE — 99285 EMERGENCY DEPT VISIT HI MDM: CPT

## 2024-11-17 PROCEDURE — 80048 BASIC METABOLIC PNL TOTAL CA: CPT

## 2024-11-17 ASSESSMENT — PAIN DESCRIPTION - DESCRIPTORS: DESCRIPTORS: ACHING

## 2024-11-17 ASSESSMENT — PAIN SCALES - GENERAL: PAINLEVEL_OUTOF10: 7

## 2024-11-17 ASSESSMENT — PAIN DESCRIPTION - PAIN TYPE: TYPE: ACUTE PAIN

## 2024-11-17 ASSESSMENT — PAIN DESCRIPTION - LOCATION: LOCATION: KNEE

## 2024-11-17 ASSESSMENT — PAIN DESCRIPTION - ORIENTATION: ORIENTATION: RIGHT

## 2024-11-17 ASSESSMENT — PAIN - FUNCTIONAL ASSESSMENT: PAIN_FUNCTIONAL_ASSESSMENT: 0-10
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
## 2024-11-17 NOTE — ED NOTES
Pt dressed out; checked by this tech and security. Pt's valuables placed in security safe (receipt to be scanned into pt's EMR). Pt has 1 belonging bag containing various clothing items and shoes (placed on middle shelf of locker 2). Pt cooperative; urine and blood samples sent to lab. [employer] computer #2 at bedside.

## 2024-11-17 NOTE — ED PROVIDER NOTES
suicidal plan.           Diagnostic Study Results     Labs -  Recent Results (from the past 12 hour(s))   Urine Drug Screen    Collection Time: 11/17/24  5:55 PM   Result Value Ref Range    Benzodiazepines, Urine Negative NEG      Barbiturates, Urine Negative NEG      THC, TH-Cannabinol, Urine Positive (A) NEG      Opiates, Urine Negative NEG      Phencyclidine, Urine Negative NEG      Cocaine, Urine Positive (A) NEG      Amphetamine, Urine Negative NEG      Methadone, Urine Negative NEG      Comments: (NOTE)    CBC with Auto Differential    Collection Time: 11/17/24  6:05 PM   Result Value Ref Range    WBC 9.3 4.6 - 13.2 K/uL    RBC 4.99 4.35 - 5.65 M/uL    Hemoglobin 15.1 13.0 - 16.0 g/dL    Hematocrit 45.0 36.0 - 48.0 %    MCV 90.2 78.0 - 100.0 FL    MCH 30.3 24.0 - 34.0 PG    MCHC 33.6 31.0 - 37.0 g/dL    RDW 12.9 11.6 - 14.5 %    Platelets 220 135 - 420 K/uL    MPV 9.7 9.2 - 11.8 FL    Nucleated RBCs 0.0 0  WBC    nRBC 0.00 0.00 - 0.01 K/uL    Neutrophils % 67 40 - 73 %    Lymphocytes % 21 21 - 52 %    Monocytes % 11 (H) 3 - 10 %    Eosinophils % 1 0 - 5 %    Basophils % 0 0 - 2 %    Immature Granulocytes % 0 0.0 - 0.5 %    Neutrophils Absolute 6.2 1.8 - 8.0 K/UL    Lymphocytes Absolute 1.9 0.9 - 3.6 K/UL    Monocytes Absolute 1.0 0.05 - 1.2 K/UL    Eosinophils Absolute 0.1 0.0 - 0.4 K/UL    Basophils Absolute 0.0 0.0 - 0.1 K/UL    Immature Granulocytes Absolute 0.0 0.00 - 0.04 K/UL    Differential Type AUTOMATED     Basic Metabolic Panel    Collection Time: 11/17/24  6:05 PM   Result Value Ref Range    Sodium 134 (L) 136 - 145 mmol/L    Potassium 4.2 3.5 - 5.5 mmol/L    Chloride 103 100 - 111 mmol/L    CO2 28 21 - 32 mmol/L    Anion Gap 3 3.0 - 18 mmol/L    Glucose 98 74 - 99 mg/dL    BUN 12 7.0 - 18 MG/DL    Creatinine 1.20 0.6 - 1.3 MG/DL    BUN/Creatinine Ratio 10 (L) 12 - 20      Est, Glom Filt Rate 85 >60 ml/min/1.73m2    Calcium 9.4 8.5 - 10.1 MG/DL   Ethanol    Collection Time: 11/17/24  6:05 PM

## 2024-11-18 PROBLEM — F20.9 SCHIZOPHRENIA (HCC): Status: ACTIVE | Noted: 2024-11-18

## 2024-11-18 PROBLEM — F14.20 COCAINE USE DISORDER, SEVERE, DEPENDENCE (HCC): Status: ACTIVE | Noted: 2024-11-18

## 2024-11-18 PROBLEM — F20.3 UNDIFFERENTIATED SCHIZOPHRENIA (HCC): Status: ACTIVE | Noted: 2024-11-18

## 2024-11-18 PROBLEM — F70 MILD INTELLECTUAL DISABILITIES: Status: ACTIVE | Noted: 2024-11-18

## 2024-11-18 PROBLEM — S06.9XAA TRAUMATIC BRAIN INJURY: Status: ACTIVE | Noted: 2024-11-18

## 2024-11-18 LAB
EKG ATRIAL RATE: 53 BPM
EKG DIAGNOSIS: NORMAL
EKG P AXIS: 16 DEGREES
EKG P-R INTERVAL: 158 MS
EKG Q-T INTERVAL: 464 MS
EKG QRS DURATION: 108 MS
EKG QTC CALCULATION (BAZETT): 435 MS
EKG R AXIS: -19 DEGREES
EKG T AXIS: -11 DEGREES
EKG VENTRICULAR RATE: 53 BPM

## 2024-11-18 PROCEDURE — 1240000000 HC EMOTIONAL WELLNESS R&B

## 2024-11-18 PROCEDURE — 93010 ELECTROCARDIOGRAM REPORT: CPT | Performed by: INTERNAL MEDICINE

## 2024-11-18 PROCEDURE — 99222 1ST HOSP IP/OBS MODERATE 55: CPT | Performed by: PSYCHIATRY & NEUROLOGY

## 2024-11-18 PROCEDURE — 6370000000 HC RX 637 (ALT 250 FOR IP)

## 2024-11-18 PROCEDURE — 6370000000 HC RX 637 (ALT 250 FOR IP): Performed by: NURSE PRACTITIONER

## 2024-11-18 PROCEDURE — 6370000000 HC RX 637 (ALT 250 FOR IP): Performed by: PSYCHIATRY & NEUROLOGY

## 2024-11-18 RX ORDER — MULTIVITAMIN WITH IRON
1 TABLET ORAL DAILY
Status: DISCONTINUED | OUTPATIENT
Start: 2024-11-18 | End: 2024-11-20 | Stop reason: HOSPADM

## 2024-11-18 RX ORDER — TRAZODONE HYDROCHLORIDE 50 MG/1
50 TABLET, FILM COATED ORAL NIGHTLY PRN
Status: DISCONTINUED | OUTPATIENT
Start: 2024-11-18 | End: 2024-11-19

## 2024-11-18 RX ORDER — ACETAMINOPHEN 325 MG/1
650 TABLET ORAL EVERY 4 HOURS PRN
Status: DISCONTINUED | OUTPATIENT
Start: 2024-11-18 | End: 2024-11-20 | Stop reason: HOSPADM

## 2024-11-18 RX ORDER — POLYETHYLENE GLYCOL 3350 17 G/17G
17 POWDER, FOR SOLUTION ORAL DAILY PRN
Status: DISCONTINUED | OUTPATIENT
Start: 2024-11-18 | End: 2024-11-20 | Stop reason: HOSPADM

## 2024-11-18 RX ORDER — HALOPERIDOL 5 MG/1
5 TABLET ORAL EVERY 6 HOURS PRN
Status: DISCONTINUED | OUTPATIENT
Start: 2024-11-18 | End: 2024-11-20 | Stop reason: HOSPADM

## 2024-11-18 RX ORDER — ARIPIPRAZOLE 5 MG/1
5 TABLET ORAL DAILY
Status: DISCONTINUED | OUTPATIENT
Start: 2024-11-18 | End: 2024-11-19

## 2024-11-18 RX ORDER — HYDROXYZINE HYDROCHLORIDE 50 MG/1
50 TABLET, FILM COATED ORAL 3 TIMES DAILY PRN
Status: DISCONTINUED | OUTPATIENT
Start: 2024-11-18 | End: 2024-11-20 | Stop reason: HOSPADM

## 2024-11-18 RX ADMIN — HYDROXYZINE HYDROCHLORIDE 50 MG: 50 TABLET, FILM COATED ORAL at 12:07

## 2024-11-18 RX ADMIN — ARIPIPRAZOLE 5 MG: 5 TABLET ORAL at 16:30

## 2024-11-18 RX ADMIN — DICLOFENAC SODIUM 2 G: 10 GEL TOPICAL at 12:07

## 2024-11-18 RX ADMIN — TRAZODONE HYDROCHLORIDE 50 MG: 50 TABLET ORAL at 20:00

## 2024-11-18 RX ADMIN — THERA TABS 1 TABLET: TAB at 16:30

## 2024-11-18 ASSESSMENT — PAIN SCALES - GENERAL
PAINLEVEL_OUTOF10: 0
PAINLEVEL_OUTOF10: 0

## 2024-11-18 ASSESSMENT — LIFESTYLE VARIABLES
HOW OFTEN DO YOU HAVE A DRINK CONTAINING ALCOHOL: 2-4 TIMES A MONTH
HOW MANY STANDARD DRINKS CONTAINING ALCOHOL DO YOU HAVE ON A TYPICAL DAY: 1 OR 2

## 2024-11-18 ASSESSMENT — SLEEP AND FATIGUE QUESTIONNAIRES
AVERAGE NUMBER OF SLEEP HOURS: 3
DO YOU USE A SLEEP AID: NO
DO YOU HAVE DIFFICULTY SLEEPING: NO

## 2024-11-18 NOTE — H&P
from him either.  We will place him on the Abilify 5 mg daily and hopefully that is the medicine he has been taking though we are not certain.  We will write for as needed medicines for agitation.  We will try to get information from the community services board in [place] and [facility].  Continue individual group and milieu therapies or therapy social work services.  Estimated length of stay 5 to 6 days    Anticipated disposition follow-up with [facility] services board probably in [place] that that is where he is going to be living.  We will try to see if we can get him a referral to transitional care program to see if they can help with housing.  Hopefully he will be more cooperative as he gets some rest.  And back on medicines    Time spent: 15 minutes chart review and nursing report, 10-minute face-to-face, 25-minute admission and orders.    Behavioral Services  Medicare Certification Upon Admission    I certify that this patient's inpatient psychiatric hospital admission is medically necessary for:      [x] Treatment which could reasonably be expected to improve this patient's condition,       [] For diagnostic study;     AND     [x] The inpatient psychiatric services are provided while the individual is under the care of a physician and are included in the individualized plan of care.    Estimated length of stay/service 5 d    Plan for post-hospital care [facility]    Electronically signed by Reyes Butler MD on 11/18/2024 at 2:49 PM

## 2024-11-18 NOTE — PLAN OF CARE
Problem: Self Harm/Suicidality  Goal: Will have no self-injury during hospital stay  Description: INTERVENTIONS:  1.  Ensure constant observer at bedside with Q15M safety checks  2.  Maintain a safe environment  3.  Secure patient belongings  4.  Ensure family/visitors adhere to safety recommendations  5.  Ensure safety tray has been added to patient's diet order  6.  Every shift and PRN: Re-assess suicidal risk via Frequent Screener    Flowsheets (Taken 11/18/2024 0406)  Will have no self-injury during hospital stay:   Ensure constant observer at bedside with Q15M safety checks   Secure patient belongings   Ensure safety tray has been added to patient's diet order   Every shift and PRN: Re-assess suicidal risk via Frequent Screener   Pt. arrived to the unit from ED via wheelchair under voluntary admission for Psychosis and major depression. Pt. Came to ED endorsing command auditory hallucinations. The voices is telling him to kill self by jumping in front of the  car. Pt, is alert and oriented x 4. He is pleasant and cooperative during admission process. Unit guidelines, rules and routine explained to Pt. He verbalizes understanding of the teachings. Pt. Denies SI/HI at present but still endorsing auditory hallucinations.  RN WILL INITIATE, DEVELOP, IMPLEMENT, REVIEW OR REVISE TREATMENT PLAN.

## 2024-11-18 NOTE — PLAN OF CARE
Problem: Depression  Goal: Will be euthymic at discharge  Description: INTERVENTIONS:  1. Administer medication as ordered  2. Provide emotional support via 1:1 interaction with staff  3. Encourage involvement in milieu/groups/activities  4. Monitor for social isolation  Outcome: Not Progressing     Problem: Behavior  Goal: Pt/Family maintain appropriate behavior and adhere to behavioral management agreement, if implemented  Description: INTERVENTIONS:  1. Assess patient/family's coping skills and  non-compliant behavior (including use of illegal substances)  2. Notify security of behavior or suspected illegal substances which indicate the need for search of the family and/or belongings  3. Encourage verbalization of thoughts and concerns in a socially appropriate manner  4. Utilize positive, consistent limit setting strategies supporting safety of patient, staff and others  5. Encourage participation in the decision making process about the behavioral management agreement  6. If a visitor's behavior poses a threat to safety call refer to organization policy.  7. Initiate consult with , Psychosocial CNS, Spiritual Care as appropriate  Outcome: Not Progressing   Patient alert and oriented, in no acute distress at this time. Patient stated to the Behavioral health specialist that \"I see things flying\". Patient  was given   hydrOXYzine HCl (ATARAX) tablet 50 mg for his anxiety. Patient denies auditory hallucinations, suicidal ideations, harm to self or others. Patient is currently in his room resting. Will continue to monitor for location, safety and comfort.

## 2024-11-18 NOTE — CONSULTS
intact, no significant rashes/petechia/ecchymosis appreciated  Neuro: No focal neurologic deficits or gross abnormalities  Psych: A&Ox3, appropriate mood and affect    LABS, IMAGING, AND DIAGNOSTIC STUDIES  Recent Results (from the past 24 hour(s))   Urine Drug Screen    Collection Time: 11/17/24  5:55 PM   Result Value Ref Range    Benzodiazepines, Urine Negative NEG      Barbiturates, Urine Negative NEG      THC, TH-Cannabinol, Urine Positive (A) NEG      Opiates, Urine Negative NEG      Phencyclidine, Urine Negative NEG      Cocaine, Urine Positive (A) NEG      Amphetamine, Urine Negative NEG      Methadone, Urine Negative NEG      Comments: (NOTE)    CBC with Auto Differential    Collection Time: 11/17/24  6:05 PM   Result Value Ref Range    WBC 9.3 4.6 - 13.2 K/uL    RBC 4.99 4.35 - 5.65 M/uL    Hemoglobin 15.1 13.0 - 16.0 g/dL    Hematocrit 45.0 36.0 - 48.0 %    MCV 90.2 78.0 - 100.0 FL    MCH 30.3 24.0 - 34.0 PG    MCHC 33.6 31.0 - 37.0 g/dL    RDW 12.9 11.6 - 14.5 %    Platelets 220 135 - 420 K/uL    MPV 9.7 9.2 - 11.8 FL    Nucleated RBCs 0.0 0  WBC    nRBC 0.00 0.00 - 0.01 K/uL    Neutrophils % 67 40 - 73 %    Lymphocytes % 21 21 - 52 %    Monocytes % 11 (H) 3 - 10 %    Eosinophils % 1 0 - 5 %    Basophils % 0 0 - 2 %    Immature Granulocytes % 0 0.0 - 0.5 %    Neutrophils Absolute 6.2 1.8 - 8.0 K/UL    Lymphocytes Absolute 1.9 0.9 - 3.6 K/UL    Monocytes Absolute 1.0 0.05 - 1.2 K/UL    Eosinophils Absolute 0.1 0.0 - 0.4 K/UL    Basophils Absolute 0.0 0.0 - 0.1 K/UL    Immature Granulocytes Absolute 0.0 0.00 - 0.04 K/UL    Differential Type AUTOMATED     Basic Metabolic Panel    Collection Time: 11/17/24  6:05 PM   Result Value Ref Range    Sodium 134 (L) 136 - 145 mmol/L    Potassium 4.2 3.5 - 5.5 mmol/L    Chloride 103 100 - 111 mmol/L    CO2 28 21 - 32 mmol/L    Anion Gap 3 3.0 - 18 mmol/L    Glucose 98 74 - 99 mg/dL    BUN 12 7.0 - 18 MG/DL    Creatinine 1.20 0.6 - 1.3 MG/DL    BUN/Creatinine

## 2024-11-18 NOTE — GROUP NOTE
Group Therapy Note    Date: 11/18/2024    Group Start Time: 1400  Group End Time: 1445  Group Topic: Psychoeducation        Aracelis Campos        Group Therapy Note    Attendees: 3  Group Topic: Coping through seasonal changes/holidays           Patient was observed lying in bed. When asked to participate in group, patient declined to participate.       Discipline Responsible: /Counselor      Signature:  Aracelis Campos

## 2024-11-18 NOTE — ED NOTES
2200- Patient asleep on stretcher, sitter at bedside.    0000- Patient asleep on stretcher. Sitter at bedside.    0200- Patient asleep on stretcher. Sitter at bedside.    0314-Report called to Behavioral Health. Patient will be transported to room 127.

## 2024-11-18 NOTE — BH NOTE
Patient slept approx 2.5 hours with minimal lapse of sleep. Patient currently in bed resting with even unlabored breathing and rise and fall of chest. Patient provided with clean hospital issued gowns, socks, underwear, safety scrubs, and unused hygiene products. There are no further concerns as of present, close observation to continue.

## 2024-11-18 NOTE — GROUP NOTE
Group Therapy Note    Date: 11/18/2024    Group Start Time: 0940  Group End Time: 1025  Group Topic: Recreational    [facility] ADULT    Anupama Spivey        Group Therapy Note    Attendees: 4/7  Group: Anxiety Thumball    Group Focus: Patients used thumball to learn coping skills for anxiety, while increasing social skills. Group members were able to answer questions, listen and communicate with peers on handling anxious feelings. This group may help enhance social skills, coping skills, mood, and decrease stress and anxiety.          Notes: Patient did not attend group.   Recreational Therapist   Anupama Spivey

## 2024-11-18 NOTE — GROUP NOTE
Group Therapy Note    Date: 11/18/2024    Group Start Time: 1500  Group End Time: 1545  Group Topic: Music Therapy      Nneka Wyman        Group Therapy Note    Attendees: 4/6    Group Focus: Music therapy group consisted of collaborative music making and group singing with music selected from a song list. The group may promote self-expression, present-centered focus, and use of music as a coping skill.       Notes:  Pt did not attend group, appearing to be sleeping when invited to group.      Discipline Responsible: /Counselor      Signature:  JESSICA Dunn, LPC  Board-Certified Music Therapist  Licensed Professional Counselor

## 2024-11-18 NOTE — BSMART NOTE
Chief Complaint   Patient presents with    Suicidal       Patient was evaluated by Tele-Psych who recommends inpatient psychiatric treatment for suicidal ideations with a plan to jump in front of a car and psychosis.    Patient is voluntary for inpatient treatment.    Past Medical History:   Diagnosis Date    ADHD     Bipolar affective (HCC)     Depression     Polysubstance abuse (HCC)     Schizophrenia (HCC)     Suicide attempt (HCC)     TBI (traumatic brain injury)        Prior to Visit Medications    Medication Sig Taking? Authorizing Provider   naloxone (NARCAN) 4 MG/0.1ML LIQD nasal spray 1 spray by Nasal route as needed for Opioid Reversal  Giselle Pino MD   predniSONE 10 MG (21) TBPK Take 6 tablets on day 1; take 5 tablets on day 2; take 4 tablets on day 3; take 3 tablets on day 4; take 2 tablets on day 5; take 1 tablet on day 6.  Mary Ann Calix PA   acetaminophen (TYLENOL) 500 MG tablet Take 1,000 mg by mouth every 6 hours as needed  Automatic Reconciliation, Ar   escitalopram (LEXAPRO) 10 MG tablet Take 10 mg by mouth daily  Automatic Reconciliation, Ar   hydrOXYzine pamoate (VISTARIL) 25 MG capsule Take 25 mg by mouth 3 times daily as needed  Automatic Reconciliation, Ar   ibuprofen (ADVIL;MOTRIN) 600 MG tablet Take 600 mg by mouth every 6 hours as needed  Automatic Reconciliation, Ar         The following labs and vitals were reviewed with on-call psychiatrist.    BMP, CBC, ETOH, UDS, and EKG    Vitals:    11/17/24 1755   BP: 114/70   Pulse: 74   Resp: 16   Temp: 97.8 °F (36.6 °C)   SpO2: 100%         Writer met with patient to assess the following    Ambulation - Patient reports ability to ambulate without difficulty or the use of any assistive devices.    ADLs - Patient able to perform own ADLs without assistance.    DME - Patient requires none.    In consultation with on call [facility] provider DADA Fischer NP patient has been accepted to [facility]. Dr. Padilla and charge nurse made aware of disposition.

## 2024-11-19 PROBLEM — R41.9 NEUROCOGNITIVE DISORDER: Status: ACTIVE | Noted: 2024-11-19

## 2024-11-19 PROBLEM — K21.9 GERD (GASTROESOPHAGEAL REFLUX DISEASE): Status: ACTIVE | Noted: 2024-11-19

## 2024-11-19 PROCEDURE — 6370000000 HC RX 637 (ALT 250 FOR IP): Performed by: PSYCHIATRY & NEUROLOGY

## 2024-11-19 PROCEDURE — 1240000000 HC EMOTIONAL WELLNESS R&B

## 2024-11-19 PROCEDURE — 6370000000 HC RX 637 (ALT 250 FOR IP): Performed by: NURSE PRACTITIONER

## 2024-11-19 PROCEDURE — 99232 SBSQ HOSP IP/OBS MODERATE 35: CPT | Performed by: PSYCHIATRY & NEUROLOGY

## 2024-11-19 RX ORDER — POLYETHYLENE GLYCOL 3350 17 G
2 POWDER IN PACKET (EA) ORAL
Status: DISCONTINUED | OUTPATIENT
Start: 2024-11-19 | End: 2024-11-20 | Stop reason: HOSPADM

## 2024-11-19 RX ORDER — BUSPIRONE HYDROCHLORIDE 10 MG/1
10 TABLET ORAL 2 TIMES DAILY
Status: DISCONTINUED | OUTPATIENT
Start: 2024-11-19 | End: 2024-11-20 | Stop reason: HOSPADM

## 2024-11-19 RX ORDER — TRAZODONE HYDROCHLORIDE 100 MG/1
100 TABLET ORAL NIGHTLY
Status: DISCONTINUED | OUTPATIENT
Start: 2024-11-19 | End: 2024-11-20 | Stop reason: HOSPADM

## 2024-11-19 RX ORDER — PETROLATUM 420 MG/G
OINTMENT TOPICAL DAILY PRN
Status: DISCONTINUED | OUTPATIENT
Start: 2024-11-19 | End: 2024-11-20 | Stop reason: HOSPADM

## 2024-11-19 RX ORDER — PANTOPRAZOLE SODIUM 40 MG/1
40 TABLET, DELAYED RELEASE ORAL
Status: DISCONTINUED | OUTPATIENT
Start: 2024-11-19 | End: 2024-11-20 | Stop reason: HOSPADM

## 2024-11-19 RX ORDER — ARIPIPRAZOLE 5 MG/1
10 TABLET ORAL NIGHTLY
Status: DISCONTINUED | OUTPATIENT
Start: 2024-11-20 | End: 2024-11-20 | Stop reason: HOSPADM

## 2024-11-19 RX ADMIN — BUSPIRONE HYDROCHLORIDE 10 MG: 10 TABLET ORAL at 20:15

## 2024-11-19 RX ADMIN — TRAZODONE HYDROCHLORIDE 100 MG: 100 TABLET ORAL at 20:15

## 2024-11-19 RX ADMIN — NICOTINE POLACRILEX 2 MG: 2 LOZENGE ORAL at 11:08

## 2024-11-19 RX ADMIN — ARIPIPRAZOLE 5 MG: 5 TABLET ORAL at 11:08

## 2024-11-19 RX ADMIN — PANTOPRAZOLE SODIUM 40 MG: 40 TABLET, DELAYED RELEASE ORAL at 15:20

## 2024-11-19 RX ADMIN — DICLOFENAC SODIUM 2 G: 10 GEL TOPICAL at 11:06

## 2024-11-19 RX ADMIN — NICOTINE POLACRILEX 2 MG: 2 LOZENGE ORAL at 16:32

## 2024-11-19 RX ADMIN — THERA TABS 1 TABLET: TAB at 11:08

## 2024-11-19 RX ADMIN — NICOTINE POLACRILEX 2 MG: 2 LOZENGE ORAL at 20:37

## 2024-11-19 RX ADMIN — ACETAMINOPHEN 325MG 650 MG: 325 TABLET ORAL at 16:30

## 2024-11-19 RX ADMIN — BUSPIRONE HYDROCHLORIDE 10 MG: 10 TABLET ORAL at 15:19

## 2024-11-19 ASSESSMENT — PAIN SCALES - GENERAL
PAINLEVEL_OUTOF10: 0
PAINLEVEL_OUTOF10: 0
PAINLEVEL_OUTOF10: 6

## 2024-11-19 ASSESSMENT — PAIN DESCRIPTION - PAIN TYPE: TYPE: OTHER (COMMENT)

## 2024-11-19 ASSESSMENT — PAIN DESCRIPTION - DESCRIPTORS: DESCRIPTORS: SORE

## 2024-11-19 ASSESSMENT — PAIN DESCRIPTION - DIRECTION: RADIATING_TOWARDS: NO

## 2024-11-19 ASSESSMENT — PAIN DESCRIPTION - ONSET: ONSET: PROGRESSIVE

## 2024-11-19 ASSESSMENT — PAIN DESCRIPTION - FREQUENCY: FREQUENCY: INTERMITTENT

## 2024-11-19 ASSESSMENT — PAIN - FUNCTIONAL ASSESSMENT: PAIN_FUNCTIONAL_ASSESSMENT: ACTIVITIES ARE NOT PREVENTED

## 2024-11-19 ASSESSMENT — PAIN DESCRIPTION - LOCATION: LOCATION: MOUTH

## 2024-11-19 NOTE — PLAN OF CARE
Problem: Spiritual Care  Goal: Pt/Family able to move forward in process of forgiving self, others, and/or higher power  Description: INTERVENTIONS:  1. Assist patient/family to overcome blocks to healing by use of spiritual practices (prayer, meditation, guided imagery, reiki, breath work, etc).  2. De-myth guilt and help patient/family identify possible irrational spiritual/cultural beliefs and values.  3. Explore possibilities of making amends & reconciliation with self, others, and/or a greater power.  4. Guide patient/family in identifying painful feelings of guilt.  5. Help patient/famiy explore and identify spiritual beliefs, cultural understandings or values that may help or hinder letting go of issue.  6. Help patient/family explore feelings of anger, bitterness, resentment.  7. Help patient/family identify and examine the situation in which these feelings are experienced.  8. Help patient/family identify destructive displacement of feelings onto other individuals.  9. Invite use of sacraments/rituals/ceremonies as appropriate (e.g. - confession, anointing, smudging).  10. Refer patient/family to formal counseling and/or to [facility] community for further support work.  Outcome: Not Progressing  Flowsheets (Taken 11/18/2024 9804)  Patient/family able to move forward in process of forgiving self, others, and/or higher power:   Help patient explore feelings of anger, bitterness, resentment   Help patient explore and identify spiritual beliefs, cultural understandings or values that may help or hinder letting go of issue     Problem: Behavior  Goal: Pt/Family maintain appropriate behavior and adhere to behavioral management agreement, if implemented  Description: INTERVENTIONS:  1. Assess patient/family's coping skills and  non-compliant behavior (including use of illegal substances)  2. Notify security of behavior or suspected illegal substances which indicate the need for search of the family and/or

## 2024-11-19 NOTE — PLAN OF CARE
Problem: Depression  Goal: Will be euthymic at discharge  Description: INTERVENTIONS:  1. Administer medication as ordered  2. Provide emotional support via 1:1 interaction with staff  3. Encourage involvement in milieu/groups/activities  4. Monitor for social isolation  Outcome: Progressing     Problem: Behavior  Goal: Pt/Family maintain appropriate behavior and adhere to behavioral management agreement, if implemented  Description: INTERVENTIONS:  1. Assess patient/family's coping skills and  non-compliant behavior (including use of illegal substances)  2. Notify security of behavior or suspected illegal substances which indicate the need for search of the family and/or belongings  3. Encourage verbalization of thoughts and concerns in a socially appropriate manner  4. Utilize positive, consistent limit setting strategies supporting safety of patient, staff and others  5. Encourage participation in the decision making process about the behavioral management agreement  6. If a visitor's behavior poses a threat to safety call refer to organization policy.  7. Initiate consult with , Psychosocial CNS, Spiritual Care as appropriate  11/19/2024 1211 by Flavia Watson RN  Outcome: Progressing     Patient alert and oriented, in no acute distress at this time. Patient is compliant with taking medications at this time. Patient denies suicidal ideations, harm to self or others but continues to endorse auditory hallucinations. Patient stated he was having nightmares and the toilet in the bathroom was making him think of nursing home.  Patient is sitting quietly in the day room. Will continue to monitor for location, safety and comfort.

## 2024-11-19 NOTE — GROUP NOTE
Art Therapy Group Progress Note    PATIENT SCHEDULED FOR GROUP AT:  1:10 PM    GROUP STOP TIME:  1:45 PM    ATTENDANCE: Moderate (3/7 participants)     TOPIC / FOCUS: Positive Affirmation Coloring Sheets    GOALS: define positive affirmations, identify ways to incorporate affirmations into daily practice, encourage creativity, increase focus, promote social skills/connection, increase positive feelings, increase autonomy, promote positive coping skills, reduce feelings of anxiety and stress     PARTICIPATION LEVEL:  Pt did not attend.     Jenaro Guerra  Art Therapist, MA, ATR-P  Provisional Registered Art Therapist

## 2024-11-19 NOTE — BH NOTE
Patient slept approx 10.5 hours with minimal lapse of sleep. Patient currently in bed resting with even unlabored breathing and rise and fall of chest. Patient provided with clean hospital issued gowns, socks, underwear, safety scrubs, and unused hygiene products. There are no further concerns as of present, close observation to continue.

## 2024-11-19 NOTE — GROUP NOTE
Group Therapy Note    Date: 11/19/2024    Group Start Time: 0930  Group End Time: 1020  Group Topic: Music Therapy      Nneka Wyman        Group Therapy Note    Attendees: 4/8    Group Focus: Music therapy group consisted of a mindfulness-based music listening exercise. Therapist provided psychoeducation on mindfulness and how to utilize music as a form of mindfulness. Group members listened, wrote, and discussed what they noticed in five songs of various genres. Group members discussed instruments and other musical elements, emotions in the music, thoughts, associations with music, memories, and body sensations. The group may promote self-awareness and use of music and mindfulness as coping skills.       Notes:  Pt attended group late and engaged in the group in an on-topic, appropriate manner. Therapist offered handout and pt mentioned having difficulty with writing. Pt interacted verbally in the group related to the music. At times, pt's responses seemed tangential.     Status After Intervention:  Unchanged    Participation Level: Interactive    Participation Quality: Appropriate, Attentive, and Sharing      Speech:  normal      Thought Process/Content: Logical, Tangential at times      Affective Functioning: Congruent      Mood: euthymic      Level of consciousness:  Alert and Attentive      Response to Learning: Able to verbalize current knowledge/experience      Endings: None Reported    Modes of Intervention: Support, Socialization, Exploration, and Media      Discipline Responsible: /Counselor      Signature:  JESSICA Dunn, LPC  Board-Certified Music Therapist  Licensed Professional Counselor

## 2024-11-20 VITALS
RESPIRATION RATE: 16 BRPM | HEIGHT: 74 IN | SYSTOLIC BLOOD PRESSURE: 118 MMHG | HEART RATE: 69 BPM | DIASTOLIC BLOOD PRESSURE: 76 MMHG | BODY MASS INDEX: 23.1 KG/M2 | TEMPERATURE: 98.2 F | OXYGEN SATURATION: 100 % | WEIGHT: 180 LBS

## 2024-11-20 PROCEDURE — 6370000000 HC RX 637 (ALT 250 FOR IP): Performed by: PSYCHIATRY & NEUROLOGY

## 2024-11-20 PROCEDURE — 99239 HOSP IP/OBS DSCHRG MGMT >30: CPT | Performed by: PSYCHIATRY & NEUROLOGY

## 2024-11-20 RX ORDER — ARIPIPRAZOLE 10 MG/1
10 TABLET ORAL NIGHTLY
Qty: 30 TABLET | Refills: 0 | Status: SHIPPED | OUTPATIENT
Start: 2024-11-20

## 2024-11-20 RX ORDER — TRAZODONE HYDROCHLORIDE 100 MG/1
100 TABLET ORAL NIGHTLY
Qty: 30 TABLET | Refills: 0 | Status: SHIPPED | OUTPATIENT
Start: 2024-11-20

## 2024-11-20 RX ORDER — BUSPIRONE HYDROCHLORIDE 10 MG/1
10 TABLET ORAL 2 TIMES DAILY
Qty: 60 TABLET | Refills: 0 | Status: SHIPPED | OUTPATIENT
Start: 2024-11-20

## 2024-11-20 RX ORDER — PANTOPRAZOLE SODIUM 40 MG/1
40 TABLET, DELAYED RELEASE ORAL
Qty: 30 TABLET | Refills: 0 | Status: SHIPPED | OUTPATIENT
Start: 2024-11-21

## 2024-11-20 RX ADMIN — PANTOPRAZOLE SODIUM 40 MG: 40 TABLET, DELAYED RELEASE ORAL at 06:55

## 2024-11-20 RX ADMIN — NICOTINE POLACRILEX 2 MG: 2 LOZENGE ORAL at 11:39

## 2024-11-20 RX ADMIN — BUSPIRONE HYDROCHLORIDE 10 MG: 10 TABLET ORAL at 08:50

## 2024-11-20 RX ADMIN — THERA TABS 1 TABLET: TAB at 08:49

## 2024-11-20 NOTE — PLAN OF CARE
Problem: Pain  Goal: Verbalizes/displays adequate comfort level or baseline comfort level  Outcome: Adequate for Discharge     Problem: Self Harm/Suicidality  Goal: Will have no self-injury during hospital stay  Description: INTERVENTIONS:  1.  Ensure constant observer at bedside with Q15M safety checks  2.  Maintain a safe environment  3.  Secure patient belongings  4.  Ensure family/visitors adhere to safety recommendations  5.  Ensure safety tray has been added to patient's diet order  6.  Every shift and PRN: Re-assess suicidal risk via Frequent Screener    Outcome: Adequate for Discharge     Problem: Depression  Goal: Will be euthymic at discharge  Description: INTERVENTIONS:  1. Administer medication as ordered  2. Provide emotional support via 1:1 interaction with staff  3. Encourage involvement in milieu/groups/activities  4. Monitor for social isolation  Outcome: Adequate for Discharge     Problem: Behavior  Goal: Pt/Family maintain appropriate behavior and adhere to behavioral management agreement, if implemented  Description: INTERVENTIONS:  1. Assess patient/family's coping skills and  non-compliant behavior (including use of illegal substances)  2. Notify security of behavior or suspected illegal substances which indicate the need for search of the family and/or belongings  3. Encourage verbalization of thoughts and concerns in a socially appropriate manner  4. Utilize positive, consistent limit setting strategies supporting safety of patient, staff and others  5. Encourage participation in the decision making process about the behavioral management agreement  6. If a visitor's behavior poses a threat to safety call refer to organization policy.  7. Initiate consult with , Psychosocial CNS, Spiritual Care as appropriate  Outcome: Adequate for Discharge     Problem: Self Care Deficit  Goal: Return ADL status to a safe level of function  Description: INTERVENTIONS:  1. Administer medication

## 2024-11-20 NOTE — PROGRESS NOTES
Behavioral Health Progress  Note    Admit Date: 11/17/2024  Hospital day 1    Vitals : Patient Vitals for the past 8 hrs:   BP Temp Temp src Pulse Resp SpO2   11/19/24 0840 111/68 98.7 °F (37.1 °C) Oral 90 18 99 %     Labs:    No results found for this or any previous visit (from the past 24 hour(s)).    Meds:   Current Facility-Administered Medications   Medication Dose Route Frequency    nicotine polacrilex (COMMIT) lozenge 2 mg  2 mg Oral Q2H PRN    Petrolatum 42 % OINT   Topical Daily PRN    [START ON 11/20/2024] ARIPiprazole (ABILIFY) tablet 10 mg  10 mg Oral Nightly    traZODone (DESYREL) tablet 100 mg  100 mg Oral Nightly    busPIRone (BUSPAR) tablet 10 mg  10 mg Oral BID    acetaminophen (TYLENOL) tablet 650 mg  650 mg Oral Q4H PRN    polyethylene glycol (GLYCOLAX) packet 17 g  17 g Oral Daily PRN    hydrOXYzine HCl (ATARAX) tablet 50 mg  50 mg Oral TID PRN    diclofenac sodium (VOLTAREN) 1 % gel 2 g  2 g Topical BID    multivitamin 1 tablet  1 tablet Oral Daily    haloperidol (HALDOL) tablet 5 mg  5 mg Oral Q6H PRN      Hospital Problems: Principal Problem:    Undifferentiated schizophrenia (HCC)  Active Problems:    Traumatic brain injury    Mild intellectual disabilities    Cocaine use disorder, severe, dependence (HCC)  Resolved Problems:    * No resolved hospital problems. *      Subjective:     Mental Status Exam  Sensorium: alert  Orientation: only aware of place and person  Relations: guarded  Eye Contact: poor  Appearance: is unkempt  Thought Process: slow rate of thoughts, poor abstract reasoning/computation, and simple but seems logical although minimal responses and  difficult to understand    Thought Content: poverty of thought   Suicidal: denies.  Homicidal: denies   Mood: is  irritable   Affect: stable  Memory: is impaired     Concentration: fair  Abstraction: concrete  Insight: The patient shows no insight    OR Poor  Judgement: is psychologically impaired and is cognitively impaired OR  
Patient is currently being seen by [facility] team at [address] [place] [id number].   Patient's next scheduled appointment is Monday 11/25/2024 at 11AM      [facility] [address] [address]  [phone]  
Pt complained of difficulty falling asleep. Trazodone 50 mg PO given per PRN order. Pt tolerated medication well; no further complaints at this time.      2100  Pt in bedroom resting with eyes closed; pt with respirations even and unlabored. No further complaints at this time.   
Pt dis nit attend group
Pt received 9 hrs of sleep uninterrupted no behavior issues   
SOCIAL WORK GROUP THERAPY PROGRESS NOTE    Group Time:  10:30am    Group Topic:  Coping Skills    Group Participation:     Pt expressed not interested in attending session despite staff support      
SOCIAL WORK GROUP THERAPY PROGRESS NOTE    Group Time:  10:30am    Group Topic:  Coping Skills    Group Participation:     Pt expressed not interested in attending session despite staff support. Somewhat irritable & wanted to be left alone.      
SW Contact:      Assessment: Axis I. Undifferentiated schizophrenia. Rule out bipolar or paranoid type. Cocaine use disorder severe. Cannabis use disorder moderate. Opioid use disorder mild in remission. Neurocognitive disorder     Pt Contact: He was seen 1st in day area but didn't want to speak about his admission as it was :\"lunch\" time.    Around 1pm seem 2nd time, found sleeping in room. Explained my role on tx team. Sarcastically commented you have couple minutes. Only help he wants    Did agree to sign release to contact:  [facility]  Joel Haque  # [phone]    About 8pm  Spoke to answering service.. they recommended to call back tomorrow. They did not have any listing for Joel Haque at [email or web address] on their listings.for employees. Only other option was [facility] Call Clinician. I Passed    Dr & tx team updated        
The above pt is currently being seen by history n physical doctor at this time. He is being accompanied by staff.
grade    Employment and/or Benefits:    [] Yes (Specify)  [] No      Leisure & Recreational Interests and Hobbies/Coping Skills: refused to discuss    Ability to Complete Activities of Daily Living (ADLs):  [x] Yes  [] No (Specify)    Health Issues:  History of traumatic brain injury.     Mosque Preferences:     None [x]  Yes [] (Specify)  ____________________________    Environment/Home Setting and Family Circumstances: HOMELESS    Social Support Network: only [facility]    Legal Status:  [] Temporary snf Order  [] Involuntary Commitment  [] Guardian  [] Payee:  [x] Other (Specify):Volunteer    Collateral Contact Identified  Name:  Relationship:Joel Tao  Number:     Pertinent Collateral Information: MAIN support is program [facility]     Access to [employer] per Patient and/or Collateral Contact: [] Reported  [x] Denies         Initial Discharge Plan:  [] Home:   [x] Shelter:  [] Crisis Unit:  [x] Substance Abuse Rehab:  [] Nursing Facility:  [] Other (Specify):    Follow up Community Services:  Anil Haque    Ability to access services including transportation: [employer]    Safety Plan reviewed, updated or completed:  contract for safety with charge nurse & tx team    Brief Clinical Summary (to include recommendations for treatment):   SEE EXTENSIVE DR H & P    Assessment: Axis I.  Undifferentiated schizophrenia.  Rule out bipolar or paranoid type.  Cocaine use disorder severe.  Cannabis use disorder moderate.  Opioid use disorder mild in remission.  Neurocognitive disorder     Pt Contact: He mostly in bed, irritable commented \"leave me alone\", unwilling to discuss admission    Dr & tx team updated.

## 2024-11-20 NOTE — PLAN OF CARE
Problem: Depression  Goal: Will be euthymic at discharge  Description: INTERVENTIONS:  1. Administer medication as ordered  2. Provide emotional support via 1:1 interaction with staff  3. Encourage involvement in milieu/groups/activities  4. Monitor for social isolation  11/19/2024 2106 by Odalys Guevara RN  Outcome: Progressing     Problem: Self Harm/Suicidality  Goal: Will have no self-injury during hospital stay  Description: INTERVENTIONS:  1.  Ensure constant observer at bedside with Q15M safety checks  2.  Maintain a safe environment  3.  Secure patient belongings  4.  Ensure family/visitors adhere to safety recommendations  5.  Ensure safety tray has been added to patient's diet order  6.  Every shift and PRN: Re-assess suicidal risk via Frequent Screener    Outcome: Progressing     Problem: Safety - Adult  Goal: Free from fall injury  Outcome: Progressing   Pt in bed resting as the initial round was conducted. Pt noted alert, oriented, and in no acute distress. Pt described his mood as \"better,\" and he had a good day. Pt came out for a snack, took his medication, and went back to his room. Pt is polite and respectful to staff and peers. No issues or concerns were noted during this shift. Pt denies any intent to harm himself or others. Will monitor.

## 2024-11-20 NOTE — GROUP NOTE
Group Therapy Note    Date: 11/20/2024    Group Start Time: 1300  Group End Time: 1345  Group Topic: Music Therapy      Nneka Wyman        Group Therapy Note    Attendees: 2/5    Group Focus: Music therapy group explored future-focused thinking through jd analysis and an expressive arts exercise. Group members listened to and discussed a song with lyrics, \"today is where your book begins, the rest is still unwritten\" and were encouraged to title the book of their life or their next chapter. Therapist assisted with verbal processing at the close of group.       Notes:  Pt attended group while waiting for discharge and discharged during the group time. Pt engaged in discussion with this therapist that was unrelated to the group topic although logical in nature. Pt discussed how his struggle is that he does not have much family. Pt discussed interests including remodeling phones, being outside, and video games.    Status After Intervention:  Unchanged    Participation Level: Interactive    Participation Quality: Appropriate      Speech:  normal      Thought Process/Content: Logical      Affective Functioning: Congruent      Mood: euthymic      Level of consciousness:  Alert and Attentive      Response to Learning: Able to verbalize current knowledge/experience      Endings: None Reported    Modes of Intervention: Support, Socialization, Exploration, and Media      Discipline Responsible: /Counselor      Signature:  JESSICA Dunn, LPC  Board-Certified Music Therapist  Licensed Professional Counselor

## 2024-11-20 NOTE — DISCHARGE SUMMARY
Gastroesophageal reflux disease.    Condition on discharge fair.  Prognosis fair as long as he takes his medicines as recommended.    Disposition: Discharged to self.  Follow-up with [facility] who will be setting up medication management and assist with housing if necessary.  Follow-up with own primary care provider or Public health.    Medications: Aripiprazole 10 mg tablet 1 at bedtime #30.  Buspirone 10 mg tablet 1 twice daily #60.  Paraphrase (Protonix) 40 mg tablet 1 daily before breakfast #30.  Trazodone 100 mg tablet 1 at bedtime #30.      Time spent: 10 minutes chart review and nursing report, 15-minute face-to-face, 20-minute admission and orders.

## 2024-11-20 NOTE — DISCHARGE INSTRUCTIONS
BEHAVIORAL HEALTH NURSING DISCHARGE NOTE      The personal items collected during your admission are returned to you:         PATIENT INSTRUCTIONS:    What to do at Home    You may not operate a vehicle for 24-hours.    You may not engage in an occupation involving machinery or appliances.    You may not drink any alcoholic beverages during the next 48-hours.    You may resume normal activities.    Avoid making any critical decisions for at least 24-hours.    Recommended diet: regular diet,    Recommended activity: activity as tolerated.    The discharge information has been reviewed with the patient.  The patient verbalized understanding.

## 2025-02-09 ENCOUNTER — HOSPITAL ENCOUNTER (EMERGENCY)
Facility: HOSPITAL | Age: 30
Discharge: PSYCHIATRIC OR SUBSTANCE USE FACILITY | End: 2025-02-09
Attending: EMERGENCY MEDICINE
Payer: COMMERCIAL

## 2025-02-09 VITALS
DIASTOLIC BLOOD PRESSURE: 75 MMHG | HEART RATE: 57 BPM | HEIGHT: 74 IN | TEMPERATURE: 98.2 F | OXYGEN SATURATION: 99 % | RESPIRATION RATE: 18 BRPM | WEIGHT: 175 LBS | BODY MASS INDEX: 22.46 KG/M2 | SYSTOLIC BLOOD PRESSURE: 118 MMHG

## 2025-02-09 DIAGNOSIS — F14.10 COCAINE ABUSE (HCC): ICD-10-CM

## 2025-02-09 DIAGNOSIS — R45.851 SUICIDAL IDEATION: Primary | ICD-10-CM

## 2025-02-09 LAB
ALBUMIN SERPL-MCNC: 4 G/DL (ref 3.4–5)
ALBUMIN/GLOB SERPL: 1.1 (ref 0.8–1.7)
ALP SERPL-CCNC: 84 U/L (ref 45–117)
ALT SERPL-CCNC: 41 U/L (ref 16–61)
AMPHET UR QL SCN: NEGATIVE
ANION GAP SERPL CALC-SCNC: 2 MMOL/L (ref 3–18)
AST SERPL-CCNC: 31 U/L (ref 10–38)
BARBITURATES UR QL SCN: NEGATIVE
BENZODIAZ UR QL: NEGATIVE
BILIRUB SERPL-MCNC: 0.4 MG/DL (ref 0.2–1)
BUN SERPL-MCNC: 14 MG/DL (ref 7–18)
BUN/CREAT SERPL: 13 (ref 12–20)
CALCIUM SERPL-MCNC: 9.4 MG/DL (ref 8.5–10.1)
CANNABINOIDS UR QL SCN: NEGATIVE
CHLORIDE SERPL-SCNC: 105 MMOL/L (ref 100–111)
CO2 SERPL-SCNC: 30 MMOL/L (ref 21–32)
COCAINE UR QL SCN: POSITIVE
CREAT SERPL-MCNC: 1.08 MG/DL (ref 0.6–1.3)
EKG ATRIAL RATE: 69 BPM
EKG DIAGNOSIS: NORMAL
EKG P AXIS: 52 DEGREES
EKG P-R INTERVAL: 156 MS
EKG Q-T INTERVAL: 392 MS
EKG QRS DURATION: 92 MS
EKG QTC CALCULATION (BAZETT): 420 MS
EKG R AXIS: 79 DEGREES
EKG T AXIS: 69 DEGREES
EKG VENTRICULAR RATE: 69 BPM
ERYTHROCYTE [DISTWIDTH] IN BLOOD BY AUTOMATED COUNT: 14 % (ref 11.6–14.5)
ETHANOL SERPL-MCNC: <3 MG/DL (ref 0–3)
FLUAV RNA SPEC QL NAA+PROBE: NOT DETECTED
FLUBV RNA SPEC QL NAA+PROBE: NOT DETECTED
GLOBULIN SER CALC-MCNC: 3.6 G/DL (ref 2–4)
GLUCOSE SERPL-MCNC: 80 MG/DL (ref 74–99)
HCT VFR BLD AUTO: 47 % (ref 36–48)
HGB BLD-MCNC: 15.5 G/DL (ref 13–16)
Lab: ABNORMAL
MCH RBC QN AUTO: 29.9 PG (ref 24–34)
MCHC RBC AUTO-ENTMCNC: 33 G/DL (ref 31–37)
MCV RBC AUTO: 90.6 FL (ref 78–100)
METHADONE UR QL: NEGATIVE
NRBC # BLD: 0 K/UL (ref 0–0.01)
NRBC BLD-RTO: 0 PER 100 WBC
OPIATES UR QL: NEGATIVE
PCP UR QL: NEGATIVE
PLATELET # BLD AUTO: 244 K/UL (ref 135–420)
PMV BLD AUTO: 10 FL (ref 9.2–11.8)
POTASSIUM SERPL-SCNC: 3.8 MMOL/L (ref 3.5–5.5)
PROT SERPL-MCNC: 7.6 G/DL (ref 6.4–8.2)
RBC # BLD AUTO: 5.19 M/UL (ref 4.35–5.65)
SARS-COV-2 RNA RESP QL NAA+PROBE: NOT DETECTED
SODIUM SERPL-SCNC: 137 MMOL/L (ref 136–145)
SOURCE: NORMAL
WBC # BLD AUTO: 8 K/UL (ref 4.6–13.2)

## 2025-02-09 PROCEDURE — 82077 ASSAY SPEC XCP UR&BREATH IA: CPT

## 2025-02-09 PROCEDURE — 93010 ELECTROCARDIOGRAM REPORT: CPT | Performed by: INTERNAL MEDICINE

## 2025-02-09 PROCEDURE — 80307 DRUG TEST PRSMV CHEM ANLYZR: CPT

## 2025-02-09 PROCEDURE — 85027 COMPLETE CBC AUTOMATED: CPT

## 2025-02-09 PROCEDURE — 93005 ELECTROCARDIOGRAM TRACING: CPT | Performed by: EMERGENCY MEDICINE

## 2025-02-09 PROCEDURE — 99285 EMERGENCY DEPT VISIT HI MDM: CPT

## 2025-02-09 PROCEDURE — 87636 SARSCOV2 & INF A&B AMP PRB: CPT

## 2025-02-09 PROCEDURE — 80053 COMPREHEN METABOLIC PANEL: CPT

## 2025-02-09 PROCEDURE — 90792 PSYCH DIAG EVAL W/MED SRVCS: CPT

## 2025-02-09 ASSESSMENT — PAIN DESCRIPTION - LOCATION
LOCATION: SHOULDER
LOCATION: FOOT

## 2025-02-09 ASSESSMENT — PAIN - FUNCTIONAL ASSESSMENT
PAIN_FUNCTIONAL_ASSESSMENT: 0-10
PAIN_FUNCTIONAL_ASSESSMENT: ACTIVITIES ARE NOT PREVENTED
PAIN_FUNCTIONAL_ASSESSMENT: 0-10
PAIN_FUNCTIONAL_ASSESSMENT: NONE - DENIES PAIN

## 2025-02-09 ASSESSMENT — PAIN DESCRIPTION - ORIENTATION: ORIENTATION: LEFT;RIGHT

## 2025-02-09 ASSESSMENT — PAIN SCALES - GENERAL
PAINLEVEL_OUTOF10: 7
PAINLEVEL_OUTOF10: 8

## 2025-02-09 ASSESSMENT — PAIN DESCRIPTION - DESCRIPTORS: DESCRIPTORS: DISCOMFORT

## 2025-02-09 NOTE — ED NOTES
Transfer Center Handoff for Behavioral Health Transfers      Patient's Current Location: [facility] EMERGENCY DEPARTMENT     Chief Complaint   Patient presents with    Suicidal       Current or History of Violent Behavior: Yes    Currently in Restraints Now or During this Encounter: No  (Specify if Agitation or self harm is noted in ED?)  If yes, please describe behaviors requiring restraint:             Medical Clearance Documented and Verified in the Chart: Yes    No Known Allergies     Can Patient Tolerate Lying Flat: No    Able to Perform ADLs:  No:    (Specify if able to ambulate or uses any mobility devices such as cane or walker)  Activity:    Level of Assistance:    Assistive Device:    Miscellaneous Devices:      LABS    CBC:   Lab Results   Component Value Date/Time    WBC 8.0 02/09/2025 12:40 AM    RBC 5.19 02/09/2025 12:40 AM    HGB 15.5 02/09/2025 12:40 AM    HCT 47.0 02/09/2025 12:40 AM    MCV 90.6 02/09/2025 12:40 AM    MCH 29.9 02/09/2025 12:40 AM    MCHC 33.0 02/09/2025 12:40 AM    RDW 14.0 02/09/2025 12:40 AM     02/09/2025 12:40 AM    MPV 10.0 02/09/2025 12:40 AM     CMP:   Lab Results   Component Value Date/Time     02/09/2025 12:40 AM    K 3.8 02/09/2025 12:40 AM     02/09/2025 12:40 AM    CO2 30 02/09/2025 12:40 AM    BUN 14 02/09/2025 12:40 AM    CREATININE 1.08 02/09/2025 12:40 AM    GFRAA >60 01/07/2025 12:47 AM    AGRATIO 1.3 06/13/2020 10:57 PM    LABGLOM >90 02/09/2025 12:40 AM    LABGLOM >60 03/17/2024 09:09 PM    GLUCOSE 80 02/09/2025 12:40 AM    GLUCOSE 105 01/07/2025 12:47 AM    CALCIUM 9.4 02/09/2025 12:40 AM    BILITOT 0.4 02/09/2025 12:40 AM    ALKPHOS 84 02/09/2025 12:40 AM    ALKPHOS 71 01/07/2025 12:47 AM    AST 31 02/09/2025 12:40 AM    ALT 41 02/09/2025 12:40 AM     Drug Panel:   Lab Results   Component Value Date/Time    AMPHETAMUR Negative 02/09/2025 12:35 AM    BARBITURATUR Negative 02/09/2025 12:35 AM    COCAINEUR Positive 02/09/2025 12:35

## 2025-02-09 NOTE — ED PROVIDER NOTES
EMERGENCY DEPARTMENT HISTORY AND PHYSICAL EXAM      Date: 2/9/2025  Patient Name: Schuyler Murphy      History of Presenting Illness     Chief Complaint   Patient presents with    Suicidal       Location/Duration/Severity/Modifying factors   Chief Complaint   Patient presents with    Suicidal       HPI:  Schuyler Murphy is a 30 y.o. male with PMH significant for neurocognitive disorder, undifferentiated schizophrenia, cocaine use disorder presents with thoughts of harming himself but no specific plan, homelessness. Pt  denies attempt to take his life.  Came in by EMS after walking up to the fire station.    PCP: No primary care provider on file.    No current facility-administered medications for this encounter.     Current Outpatient Medications   Medication Sig Dispense Refill    busPIRone (BUSPAR) 10 MG tablet Take 1 tablet by mouth 2 times daily 60 tablet 0    traZODone (DESYREL) 100 MG tablet Take 1 tablet by mouth nightly 30 tablet 0    ARIPiprazole (ABILIFY) 10 MG tablet Take 1 tablet by mouth at bedtime 30 tablet 0    pantoprazole (PROTONIX) 40 MG tablet Take 1 tablet by mouth every morning (before breakfast) 30 tablet 0    naloxone (NARCAN) 4 MG/0.1ML LIQD nasal spray 1 spray by Nasal route as needed for Opioid Reversal 2 each 0       Past History     Past Medical History:  Past Medical History:   Diagnosis Date    ADHD     Bipolar affective (HCC)     Depression     Polysubstance abuse (HCC)     Schizophrenia (HCC)     Suicide attempt (HCC)     TBI (traumatic brain injury)        Past Surgical History:  No past surgical history on file.    Family History:  No family history on file.    Social History:  Social History     Tobacco Use    Smoking status: Never    Smokeless tobacco: Never   Substance Use Topics    Alcohol use: No       Allergies:  No Known Allergies      Physical Exam     General: Patient is awake and alert, resting comfortably in no acute distress.  Patient asleep, lying head down, did

## 2025-02-09 NOTE — ED NOTES
Pt dressed out. Checked by security and this tech. Pt's belongings placed in bottom shelf of locker 3 and pt's valuables locked in security safe (receipt to be scanned into EMR). Blood work and urine sent to lab. Pt provided with sandwich.

## 2025-02-09 NOTE — VIRTUAL HEALTH
Schuyler Murphy  [id number]  1995     EMERGENCY DEPARTMENT TELEPSYCHIATRY EVALUATION    02/09/25    Chief Complaint:  “Feeling hopeless”  HPI: Patient is a 30 y.o.  male who presents for psychiatric evaluation. Patient presented to the ED on 02/09/25 from community.  Per ED documentation: \"Pt to ED c/o SI thoughts and plan to OD on pills, pt is homeless, no place to go, bipolar schizophrenic.\"    Patient reports presenting to the ED due to feeling hopelessness.  And stating that it something that he has to work on.  However at this time has nothing else he can do to increase his hope and confidence.  He states that this is in part why he came to the ED.  Patient reports having thoughts of wanting to harm himself, unsure of exactly how at this time.  He reports having a history of suicide attempts stating these had multiple in the past.    Identifies that his homelessness is a significant part of his stress and that he recently became homeless about a week ago.  He reports previously living with his cousin and paying his cousin to stay there but his cousin was not using the money for paying bills. he states since then he has not been able to sleep very well and has not slept the last 3 days.  He reports having decreased interest in activities he found enjoyable including music.  Patient endorses feelings of worthlessness as well as hopelessness about his future.  He endorses poor concentration and states that he has never really been able to focus.  Patient states that he does not feel as if he would be able to maintain safety if discharged and would likely act on harming himself.     Patient reports having ongoing auditory and visual hallucinations that he has dealt with day-to-day.  He states that he typically tries to overcome them and does not quite know what they are saying.  However lately they have not been good.  He denies feelings of paranoia or feeling as if people are out to get him or

## 2025-02-09 NOTE — ED TRIAGE NOTES
Pt to ED c/o SI thoughts and plan to OD on pills, pt is homeless, no place to go, bipolar schizophrenic.

## 2025-02-09 NOTE — BSMART NOTE
Crisis Note: Spoke with ElizCorwin, [phone]     0800   Call from Haven Schultz MD   [No callback number listed]   Haven Schultz MD Approved Transfer for Psychiatry to [facility]   Haven Schultz MD Marked as Admitting Provider to [facility]   Contact Role: Accepting Provider   Entered By: Beny Storey LPN   Pt accepted Dr. Haven Giordano     N2N [phone]     Bed upon arrival      [facility] requesting ETA      [facility] address : [facility] [address], [facility] [address]        Awaiting transportation. ED charge nurse and provider made aware

## 2025-02-09 NOTE — ED NOTES
Bedside shift change report given to Apolinar (oncoming nurse) by TERRI (offgoing nurse). Report included the following information Nurse Handoff Report, ED SBAR, and MAR.

## 2025-02-10 NOTE — ED NOTES
Verbal shift change report given to Marjan (oncoming nurse) by Quiana (offgoing nurse). Report included the following information ED Encounter Summary, ED SBAR, and MAR.

## 2025-04-01 ENCOUNTER — HOSPITAL ENCOUNTER (EMERGENCY)
Facility: HOSPITAL | Age: 30
Discharge: HOME | End: 2025-04-01
Payer: COMMERCIAL

## 2025-04-01 VITALS
RESPIRATION RATE: 18 BRPM | TEMPERATURE: 97.6 F | WEIGHT: 175 LBS | SYSTOLIC BLOOD PRESSURE: 124 MMHG | BODY MASS INDEX: 22.46 KG/M2 | DIASTOLIC BLOOD PRESSURE: 76 MMHG | HEIGHT: 74 IN | OXYGEN SATURATION: 98 % | HEART RATE: 59 BPM

## 2025-04-01 DIAGNOSIS — R45.851 SUICIDAL IDEATION: Primary | ICD-10-CM

## 2025-04-01 DIAGNOSIS — E87.6 HYPOKALEMIA: ICD-10-CM

## 2025-04-01 DIAGNOSIS — F19.90 DRUG USE: ICD-10-CM

## 2025-04-01 LAB
AMPHET UR QL SCN: NEGATIVE
ANION GAP SERPL CALC-SCNC: 1 MMOL/L (ref 3–18)
BARBITURATES UR QL SCN: NEGATIVE
BASOPHILS # BLD: 0.06 K/UL (ref 0–0.1)
BASOPHILS NFR BLD: 0.6 % (ref 0–2)
BENZODIAZ UR QL: NEGATIVE
BUN SERPL-MCNC: 12 MG/DL (ref 7–18)
BUN/CREAT SERPL: 12 (ref 12–20)
CALCIUM SERPL-MCNC: 9.2 MG/DL (ref 8.5–10.1)
CANNABINOIDS UR QL SCN: POSITIVE
CHLORIDE SERPL-SCNC: 107 MMOL/L (ref 100–111)
CO2 SERPL-SCNC: 29 MMOL/L (ref 21–32)
COCAINE UR QL SCN: POSITIVE
CREAT SERPL-MCNC: 0.98 MG/DL (ref 0.6–1.3)
DIFFERENTIAL METHOD BLD: NORMAL
EOSINOPHIL # BLD: 0.28 K/UL (ref 0–0.4)
EOSINOPHIL NFR BLD: 2.8 % (ref 0–5)
ERYTHROCYTE [DISTWIDTH] IN BLOOD BY AUTOMATED COUNT: 13.6 % (ref 11.6–14.5)
ETHANOL SERPL-MCNC: <3 MG/DL (ref 0–3)
GLUCOSE SERPL-MCNC: 93 MG/DL (ref 74–99)
HCT VFR BLD AUTO: 42.3 % (ref 36–48)
HGB BLD-MCNC: 14 G/DL (ref 13–16)
IMM GRANULOCYTES # BLD AUTO: 0.02 K/UL (ref 0–0.04)
IMM GRANULOCYTES NFR BLD AUTO: 0.2 % (ref 0–0.5)
LYMPHOCYTES # BLD: 2.29 K/UL (ref 0.9–3.6)
LYMPHOCYTES NFR BLD: 23.2 % (ref 21–52)
Lab: ABNORMAL
MCH RBC QN AUTO: 30 PG (ref 24–34)
MCHC RBC AUTO-ENTMCNC: 33.1 G/DL (ref 31–37)
MCV RBC AUTO: 90.8 FL (ref 78–100)
METHADONE UR QL: NEGATIVE
MONOCYTES # BLD: 0.76 K/UL (ref 0.05–1.2)
MONOCYTES NFR BLD: 7.7 % (ref 3–10)
NEUTS SEG # BLD: 6.45 K/UL (ref 1.8–8)
NEUTS SEG NFR BLD: 65.5 % (ref 40–73)
NRBC # BLD: 0 K/UL (ref 0–0.01)
NRBC BLD-RTO: 0 PER 100 WBC
OPIATES UR QL: NEGATIVE
PCP UR QL: NEGATIVE
PLATELET # BLD AUTO: 226 K/UL (ref 135–420)
PMV BLD AUTO: 9.6 FL (ref 9.2–11.8)
POTASSIUM SERPL-SCNC: 3.4 MMOL/L (ref 3.5–5.5)
RBC # BLD AUTO: 4.66 M/UL (ref 4.35–5.65)
SODIUM SERPL-SCNC: 137 MMOL/L (ref 136–145)
WBC # BLD AUTO: 9.9 K/UL (ref 4.6–13.2)

## 2025-04-01 PROCEDURE — 82077 ASSAY SPEC XCP UR&BREATH IA: CPT

## 2025-04-01 PROCEDURE — 99283 EMERGENCY DEPT VISIT LOW MDM: CPT

## 2025-04-01 PROCEDURE — 6370000000 HC RX 637 (ALT 250 FOR IP): Performed by: PHYSICIAN ASSISTANT

## 2025-04-01 PROCEDURE — 80048 BASIC METABOLIC PNL TOTAL CA: CPT

## 2025-04-01 PROCEDURE — 80307 DRUG TEST PRSMV CHEM ANLYZR: CPT

## 2025-04-01 PROCEDURE — 90791 PSYCH DIAGNOSTIC EVALUATION: CPT

## 2025-04-01 PROCEDURE — 85025 COMPLETE CBC W/AUTO DIFF WBC: CPT

## 2025-04-01 RX ORDER — POTASSIUM CHLORIDE 1500 MG/1
40 TABLET, EXTENDED RELEASE ORAL
Status: COMPLETED | OUTPATIENT
Start: 2025-04-01 | End: 2025-04-01

## 2025-04-01 RX ADMIN — POTASSIUM CHLORIDE 40 MEQ: 1500 TABLET, EXTENDED RELEASE ORAL at 11:31

## 2025-04-01 ASSESSMENT — PAIN - FUNCTIONAL ASSESSMENT: PAIN_FUNCTIONAL_ASSESSMENT: NONE - DENIES PAIN

## 2025-04-01 NOTE — ED NOTES
Transfer Center Handoff for Behavioral Health Transfers      Patient's Current Location: [facility] EMERGENCY DEPARTMENT     Chief Complaint   Patient presents with    Suicidal       Current or History of Violent Behavior: No    Currently in Restraints Now or During this Encounter: No  (Specify if Agitation or self harm is noted in ED?)  If yes, please describe behaviors requiring restraint:             Medical Clearance Documented and Verified in the Chart: Yes    No Known Allergies     Can Patient Tolerate Lying Flat: Yes    Able to Perform ADLs:  Yes  (Specify if able to ambulate or uses any mobility devices such as cane or walker)  Activity:    Level of Assistance:    Assistive Device:    Miscellaneous Devices:      LABS    CBC:   Lab Results   Component Value Date/Time    WBC 9.9 04/01/2025 09:29 AM    RBC 4.66 04/01/2025 09:29 AM    HGB 14.0 04/01/2025 09:29 AM    HCT 42.3 04/01/2025 09:29 AM    MCV 90.8 04/01/2025 09:29 AM    MCH 30.0 04/01/2025 09:29 AM    MCHC 33.1 04/01/2025 09:29 AM    RDW 13.6 04/01/2025 09:29 AM     04/01/2025 09:29 AM    MPV 9.6 04/01/2025 09:29 AM     CMP:   Lab Results   Component Value Date/Time     04/01/2025 09:29 AM    K 3.4 04/01/2025 09:29 AM     04/01/2025 09:29 AM    CO2 29 04/01/2025 09:29 AM    BUN 12 04/01/2025 09:29 AM    CREATININE 0.98 04/01/2025 09:29 AM    GFRAA >60 01/07/2025 12:47 AM    AGRATIO 1.3 06/13/2020 10:57 PM    LABGLOM >90 04/01/2025 09:29 AM    LABGLOM >60 03/17/2024 09:09 PM    GLUCOSE 93 04/01/2025 09:29 AM    GLUCOSE 105 01/07/2025 12:47 AM    CALCIUM 9.2 04/01/2025 09:29 AM    BILITOT 0.4 02/09/2025 12:40 AM    ALKPHOS 84 02/09/2025 12:40 AM    ALKPHOS 71 01/07/2025 12:47 AM    AST 31 02/09/2025 12:40 AM    ALT 41 02/09/2025 12:40 AM     Drug Panel:   Lab Results   Component Value Date/Time    AMPHETAMUR Negative 04/01/2025 09:20 AM    BARBITURATUR Negative 04/01/2025 09:20 AM    COCAINEUR Positive 04/01/2025 09:20 AM

## 2025-04-01 NOTE — ED NOTES
Dave from [employer] states ride is here for pt. Pt walked to [employer].  states he works for [employer]. Pt belongings given to . Pt safely in vehicle to [facility]

## 2025-04-01 NOTE — ED TRIAGE NOTES
Pt ambulatory to triage c/o SI x 2 days when he got out of rehab. No plan at this time.   Denies HI and hallucinations. Endorses uses of cocaine yesterday.

## 2025-04-01 NOTE — ED PROVIDER NOTES
EMERGENCY DEPARTMENT HISTORY AND PHYSICAL EXAM      Date: 4/1/2025  Patient Name: Schuyler Murphy    History of Presenting Illness     Chief Complaint   Patient presents with    Suicidal       History (Context): Schuyler Murphy is a 30 y.o. male  who presents to the ED today with chief complaint of SI without a plan.  Patient denies HI, visual or auditory hallucinations.  Patient notes last use of cocaine was yesterday.  Denies alcohol use.      PCP: No primary care provider on file.    No current facility-administered medications for this encounter.     Current Outpatient Medications   Medication Sig Dispense Refill    busPIRone (BUSPAR) 10 MG tablet Take 1 tablet by mouth 2 times daily 60 tablet 0    traZODone (DESYREL) 100 MG tablet Take 1 tablet by mouth nightly 30 tablet 0    ARIPiprazole (ABILIFY) 10 MG tablet Take 1 tablet by mouth at bedtime 30 tablet 0    pantoprazole (PROTONIX) 40 MG tablet Take 1 tablet by mouth every morning (before breakfast) 30 tablet 0    naloxone (NARCAN) 4 MG/0.1ML LIQD nasal spray 1 spray by Nasal route as needed for Opioid Reversal 2 each 0       Past History     Past Medical History:   Past Medical History:   Diagnosis Date    ADHD     Bipolar affective (HCC)     Depression     Polysubstance abuse (HCC)     Schizophrenia (HCC)     Suicide attempt (HCC)     TBI (traumatic brain injury) (HCC)        Past Surgical History:  No past surgical history on file.    Family History:  No family history on file.    Social History:   Social History     Tobacco Use    Smoking status: Never    Smokeless tobacco: Never   Substance Use Topics    Alcohol use: No       Allergies:  No Known Allergies      Physical Exam     Vitals:    04/01/25 0915   BP: 124/76   Pulse: 59   Resp: 18   Temp: 97.6 °F (36.4 °C)   TempSrc: Oral   SpO2: 98%   Weight: 79.4 kg (175 lb)   Height: 1.88 m (6' 2\")       Physical Exam  Vitals and nursing note reviewed.   Constitutional:       General: He is not in acute

## 2025-04-01 NOTE — ED NOTES
Dave from [facility] called states pt was accepted to [facility] and they would like to setup transportation.

## 2025-04-04 ENCOUNTER — HOSPITAL ENCOUNTER (INPATIENT)
Facility: HOSPITAL | Age: 30
LOS: 5 days | Discharge: HOME | DRG: 750 | End: 2025-04-10
Attending: STUDENT IN AN ORGANIZED HEALTH CARE EDUCATION/TRAINING PROGRAM | Admitting: PSYCHIATRY & NEUROLOGY
Payer: COMMERCIAL

## 2025-04-04 DIAGNOSIS — F19.10 POLYSUBSTANCE ABUSE: ICD-10-CM

## 2025-04-04 DIAGNOSIS — R45.851 SUICIDAL IDEATION: Primary | ICD-10-CM

## 2025-04-04 LAB
ANION GAP SERPL CALC-SCNC: 4 MMOL/L (ref 3–18)
BASOPHILS # BLD: 0.06 K/UL (ref 0–0.1)
BASOPHILS NFR BLD: 0.8 % (ref 0–2)
BUN SERPL-MCNC: 10 MG/DL (ref 7–18)
BUN/CREAT SERPL: 8 (ref 12–20)
CALCIUM SERPL-MCNC: 9.5 MG/DL (ref 8.5–10.1)
CHLORIDE SERPL-SCNC: 104 MMOL/L (ref 100–111)
CO2 SERPL-SCNC: 30 MMOL/L (ref 21–32)
CREAT SERPL-MCNC: 1.18 MG/DL (ref 0.6–1.3)
DIFFERENTIAL METHOD BLD: NORMAL
EOSINOPHIL # BLD: 0.21 K/UL (ref 0–0.4)
EOSINOPHIL NFR BLD: 2.9 % (ref 0–5)
ERYTHROCYTE [DISTWIDTH] IN BLOOD BY AUTOMATED COUNT: 13.2 % (ref 11.6–14.5)
ETHANOL SERPL-MCNC: <3 MG/DL (ref 0–3)
GLUCOSE SERPL-MCNC: 120 MG/DL (ref 74–99)
HCT VFR BLD AUTO: 46.2 % (ref 36–48)
HGB BLD-MCNC: 15.3 G/DL (ref 13–16)
IMM GRANULOCYTES # BLD AUTO: 0.03 K/UL (ref 0–0.04)
IMM GRANULOCYTES NFR BLD AUTO: 0.4 % (ref 0–0.5)
LYMPHOCYTES # BLD: 2.56 K/UL (ref 0.9–3.6)
LYMPHOCYTES NFR BLD: 35.4 % (ref 21–52)
MCH RBC QN AUTO: 29.6 PG (ref 24–34)
MCHC RBC AUTO-ENTMCNC: 33.1 G/DL (ref 31–37)
MCV RBC AUTO: 89.4 FL (ref 78–100)
MONOCYTES # BLD: 0.56 K/UL (ref 0.05–1.2)
MONOCYTES NFR BLD: 7.7 % (ref 3–10)
NEUTS SEG # BLD: 3.81 K/UL (ref 1.8–8)
NEUTS SEG NFR BLD: 52.8 % (ref 40–73)
NRBC # BLD: 0 K/UL (ref 0–0.01)
NRBC BLD-RTO: 0 PER 100 WBC
PLATELET # BLD AUTO: 265 K/UL (ref 135–420)
PMV BLD AUTO: 9.9 FL (ref 9.2–11.8)
POTASSIUM SERPL-SCNC: 3.4 MMOL/L (ref 3.5–5.5)
RBC # BLD AUTO: 5.17 M/UL (ref 4.35–5.65)
SODIUM SERPL-SCNC: 138 MMOL/L (ref 136–145)
WBC # BLD AUTO: 7.2 K/UL (ref 4.6–13.2)

## 2025-04-04 PROCEDURE — 93005 ELECTROCARDIOGRAM TRACING: CPT | Performed by: EMERGENCY MEDICINE

## 2025-04-04 PROCEDURE — 99285 EMERGENCY DEPT VISIT HI MDM: CPT

## 2025-04-04 PROCEDURE — 80048 BASIC METABOLIC PNL TOTAL CA: CPT

## 2025-04-04 PROCEDURE — 80307 DRUG TEST PRSMV CHEM ANLYZR: CPT

## 2025-04-04 PROCEDURE — 6370000000 HC RX 637 (ALT 250 FOR IP): Performed by: STUDENT IN AN ORGANIZED HEALTH CARE EDUCATION/TRAINING PROGRAM

## 2025-04-04 PROCEDURE — 82077 ASSAY SPEC XCP UR&BREATH IA: CPT

## 2025-04-04 PROCEDURE — 85025 COMPLETE CBC W/AUTO DIFF WBC: CPT

## 2025-04-04 RX ADMIN — POTASSIUM BICARBONATE 40 MEQ: 782 TABLET, EFFERVESCENT ORAL at 22:57

## 2025-04-04 ASSESSMENT — PAIN - FUNCTIONAL ASSESSMENT: PAIN_FUNCTIONAL_ASSESSMENT: NONE - DENIES PAIN

## 2025-04-05 PROBLEM — F25.0 SCHIZOAFFECTIVE DISORDER, BIPOLAR TYPE (HCC): Status: ACTIVE | Noted: 2025-04-05

## 2025-04-05 PROBLEM — F25.9 SCHIZOAFFECTIVE DISORDER (HCC): Status: ACTIVE | Noted: 2025-04-05

## 2025-04-05 LAB
AMPHET UR QL SCN: NEGATIVE
BARBITURATES UR QL SCN: NEGATIVE
BENZODIAZ UR QL: NEGATIVE
CANNABINOIDS UR QL SCN: POSITIVE
COCAINE UR QL SCN: POSITIVE
Lab: ABNORMAL
METHADONE UR QL: NEGATIVE
OPIATES UR QL: NEGATIVE
PCP UR QL: NEGATIVE

## 2025-04-05 PROCEDURE — 6370000000 HC RX 637 (ALT 250 FOR IP): Performed by: PSYCHIATRY & NEUROLOGY

## 2025-04-05 PROCEDURE — 6370000000 HC RX 637 (ALT 250 FOR IP)

## 2025-04-05 PROCEDURE — 6370000000 HC RX 637 (ALT 250 FOR IP): Performed by: STUDENT IN AN ORGANIZED HEALTH CARE EDUCATION/TRAINING PROGRAM

## 2025-04-05 PROCEDURE — 1240000000 HC EMOTIONAL WELLNESS R&B

## 2025-04-05 PROCEDURE — 90791 PSYCH DIAGNOSTIC EVALUATION: CPT | Performed by: COUNSELOR

## 2025-04-05 RX ORDER — TRAZODONE HYDROCHLORIDE 100 MG/1
100 TABLET ORAL NIGHTLY
Status: DISCONTINUED | OUTPATIENT
Start: 2025-04-05 | End: 2025-04-05

## 2025-04-05 RX ORDER — LOPERAMIDE HYDROCHLORIDE 2 MG/1
2 CAPSULE ORAL 4 TIMES DAILY PRN
Status: DISCONTINUED | OUTPATIENT
Start: 2025-04-05 | End: 2025-04-10 | Stop reason: HOSPADM

## 2025-04-05 RX ORDER — ARIPIPRAZOLE 5 MG/1
10 TABLET ORAL
Status: DISCONTINUED | OUTPATIENT
Start: 2025-04-05 | End: 2025-04-05

## 2025-04-05 RX ORDER — TRAZODONE HYDROCHLORIDE 50 MG/1
50 TABLET ORAL NIGHTLY PRN
Status: DISCONTINUED | OUTPATIENT
Start: 2025-04-05 | End: 2025-04-06

## 2025-04-05 RX ORDER — ARIPIPRAZOLE 10 MG/1
10 TABLET ORAL
Status: DISCONTINUED | OUTPATIENT
Start: 2025-04-05 | End: 2025-04-10 | Stop reason: HOSPADM

## 2025-04-05 RX ORDER — MIRTAZAPINE 15 MG/1
15 TABLET, ORALLY DISINTEGRATING ORAL NIGHTLY
Status: DISCONTINUED | OUTPATIENT
Start: 2025-04-05 | End: 2025-04-10

## 2025-04-05 RX ORDER — BUSPIRONE HYDROCHLORIDE 10 MG/1
10 TABLET ORAL 2 TIMES DAILY
Status: DISCONTINUED | OUTPATIENT
Start: 2025-04-05 | End: 2025-04-05

## 2025-04-05 RX ORDER — ARIPIPRAZOLE 5 MG/1
5 TABLET ORAL
Status: DISCONTINUED | OUTPATIENT
Start: 2025-04-05 | End: 2025-04-05

## 2025-04-05 RX ORDER — HALOPERIDOL 5 MG/1
5 TABLET ORAL EVERY 4 HOURS PRN
Status: DISCONTINUED | OUTPATIENT
Start: 2025-04-05 | End: 2025-04-10 | Stop reason: HOSPADM

## 2025-04-05 RX ORDER — HALOPERIDOL 5 MG/ML
5 INJECTION INTRAMUSCULAR EVERY 4 HOURS PRN
Status: DISCONTINUED | OUTPATIENT
Start: 2025-04-05 | End: 2025-04-10 | Stop reason: HOSPADM

## 2025-04-05 RX ORDER — HYDROXYZINE HYDROCHLORIDE 50 MG/1
50 TABLET, FILM COATED ORAL 3 TIMES DAILY PRN
Status: DISCONTINUED | OUTPATIENT
Start: 2025-04-05 | End: 2025-04-10 | Stop reason: HOSPADM

## 2025-04-05 RX ORDER — TRAZODONE HYDROCHLORIDE 50 MG/1
50 TABLET ORAL NIGHTLY PRN
Status: DISCONTINUED | OUTPATIENT
Start: 2025-04-05 | End: 2025-04-10 | Stop reason: HOSPADM

## 2025-04-05 RX ORDER — ACETAMINOPHEN 325 MG/1
650 TABLET ORAL EVERY 4 HOURS PRN
Status: DISCONTINUED | OUTPATIENT
Start: 2025-04-05 | End: 2025-04-10 | Stop reason: HOSPADM

## 2025-04-05 RX ORDER — PANTOPRAZOLE SODIUM 40 MG/1
40 TABLET, DELAYED RELEASE ORAL DAILY
Status: DISCONTINUED | OUTPATIENT
Start: 2025-04-05 | End: 2025-04-10 | Stop reason: HOSPADM

## 2025-04-05 RX ORDER — POLYETHYLENE GLYCOL 3350 17 G/17G
17 POWDER, FOR SOLUTION ORAL DAILY PRN
Status: DISCONTINUED | OUTPATIENT
Start: 2025-04-05 | End: 2025-04-10 | Stop reason: HOSPADM

## 2025-04-05 RX ADMIN — MIRTAZAPINE 15 MG: 15 TABLET, ORALLY DISINTEGRATING ORAL at 20:29

## 2025-04-05 RX ADMIN — LOPERAMIDE HYDROCHLORIDE 2 MG: 2 CAPSULE ORAL at 17:12

## 2025-04-05 RX ADMIN — BUSPIRONE HYDROCHLORIDE 10 MG: 10 TABLET ORAL at 08:05

## 2025-04-05 RX ADMIN — PANTOPRAZOLE SODIUM 40 MG: 40 TABLET, DELAYED RELEASE ORAL at 08:05

## 2025-04-05 RX ADMIN — TRAZODONE HYDROCHLORIDE 50 MG: 50 TABLET ORAL at 20:29

## 2025-04-05 RX ADMIN — ARIPIPRAZOLE 10 MG: 10 TABLET ORAL at 21:21

## 2025-04-05 RX ADMIN — HYDROXYZINE HYDROCHLORIDE 50 MG: 50 TABLET, FILM COATED ORAL at 17:12

## 2025-04-05 RX ADMIN — ACETAMINOPHEN 650 MG: 325 TABLET ORAL at 20:29

## 2025-04-05 ASSESSMENT — SLEEP AND FATIGUE QUESTIONNAIRES
SLEEP PATTERN: DIFFICULTY FALLING ASLEEP
DO YOU HAVE DIFFICULTY SLEEPING: YES
DO YOU USE A SLEEP AID: NO
AVERAGE NUMBER OF SLEEP HOURS: 15

## 2025-04-05 ASSESSMENT — LIFESTYLE VARIABLES
HOW OFTEN DO YOU HAVE A DRINK CONTAINING ALCOHOL: PATIENT DECLINED
HOW MANY STANDARD DRINKS CONTAINING ALCOHOL DO YOU HAVE ON A TYPICAL DAY: PATIENT DECLINED

## 2025-04-05 ASSESSMENT — PAIN SCALES - GENERAL
PAINLEVEL_OUTOF10: 0
PAINLEVEL_OUTOF10: 5

## 2025-04-05 NOTE — H&P
PSYCHIATRIC H&P      IDENTIFYING INFORMATION: Schuyler Murphy a 30-year-old male with a history of psychotic disorder and substance use admitted from [facility]'s emergency department. He was admitted to the behavioral health unit on April 4, 2025, and evaluated on April 5, 2025     REASON FOR ADMISSION: The patient was admitted voluntarily for suicidal ideation.      CHIEF COMPLAINT: “I don't talk to any of my family.”      HISTORY OF PRESENT ILLNESS: The patient was seen during rounds and he reports feeling suicidal due to his family “making me suicidal” due to them “spending my savings” as they serve as his payee. He further states, “I cant live my life like I'm supposed to.” He also complains that his family began “charging” him to be around them and reports conflict with family because, “I didn't fit that smart category.” It is important to mention the patient had significant difficulty providing a cohesive history and the medical record was used to obtain details about the patient's history. Nevertheless, the patient endorsed suicidal ideation but he was unable to report when these feelings began. Details regarding a specific plan were unable to be obtained. He does report at least 4 suicide attempts including 3 overdose on medications and 1 attempt by consuming excessive amounts of alcohol. The patient states, “Now I got a habit of walking in front of cars” and he states, “I don't really care about getting hit.” He endorses depressed mood and states, “I been feeling down for over 10 years.” He endorses sleep disturbance but was unable to describe further. He denies appetite disturbance. He denies auditory and visual hallucinations. The patient endorses occasional anxiety but denies experiencing anxiety on a daily basis.     Per review of the medical record, he was last hospitalized at [facility] in November 2024 and was diagnosed with undifferentiated schizophrenia. He presented with irritability, SI, and

## 2025-04-05 NOTE — BSMART NOTE
CRISIS NOTE:  Spoke with ED Charge NurseKianna.  Advised ADT-20 order, and checklist are needed to start bed search          Radha Fine, LPC, CSAC, CADC  BSMART Counselor

## 2025-04-05 NOTE — BSMART NOTE
CRISIS NOTE:  Spoke with Marita [facility] who advised Pt has been accepted to [facility], by on call Insight Provider, Annmarie Miramontes; orders pending.      Radha Fine, LPC, CSAC, CADC  BSMART Counselor

## 2025-04-05 NOTE — GROUP NOTE
Art Therapy Group Progress Note     PATIENT SCHEDULED FOR GROUP AT:  10:30 AM     GROUP STOP TIME:  11:15 AM      ATTENDANCE:   High  (5/6 participants)     TOPIC / FOCUS:  Let it Go Hot Air Balloon      GOALS:  encourage self-awareness, identify counterproductive behaviors or habits, promote positive coping skills, encourage creativity, encourage new decision-making skills, practice creativity, encourage focus      Notes:  Pt engaged in coloring in the last 15 minutes of group. Pt identified their past as something they wanted to let go. Pt shared that they are good a fixing broken phones and that it could help them to get rich.     Status After Intervention:  Unchanged    Participation Level: Interactive    Participation Quality: Appropriate      Speech:  normal and slurred at times, mumbled       Thought Process/Content: Logical  Delusional      Affective Functioning: Blunted      Mood: euthymic      Level of consciousness:  Alert and Attentive      Response to Learning: Able to verbalize current knowledge/experience      Endings: None Reported    Modes of Intervention: Education, Support, Socialization, Exploration, and Clarifying      Discipline Responsible: Psychoeducational Specialist      Jenaro Guerra   Art Therapist, MA, ATR-P   Provisional Registered Art Therapist

## 2025-04-05 NOTE — BH NOTE
[facility]  Admission Note       Admission type: Voluntary    Medical Problems:   Past Medical History:   Diagnosis Date    ADHD     Bipolar affective ([facility])     Depression     Polysubstance abuse ([facility])     Schizophrenia ([facility])     Suicide attempt ([facility])     TBI (traumatic brain injury) ([facility])        Status EXAM:  Mental Status and Behavioral Exam  Normal: No  Level of Assistance: Independent/Self  Facial Expression: Flat, Avoids Gaze  Affect: Blunt  Level of Consciousness: Alert  Frequency of Checks: 4 times per hour, close  Mood:Normal: No  Mood: Depressed, Anxious  Motor Activity:Normal: Yes  Eye Contact: Fair  Observed Behavior: Cooperative  Sexual Misconduct History: Current - no  Preception: Webb to person  Attention:Normal: Yes  Thought Processes: Circumstantial  Thought Content:Normal: No  Thought Content: Delusions  Depression Symptoms: Change in energy level, Isolative  Anxiety Symptoms: No problems reported or observed.  Soumya Symptoms: Labile  Hallucinations: Auditory (comment)  Delusions: No  Memory:Normal: No  Insight and Judgment: No  Insight and Judgment: Poor judgment, Poor insight    Pt admitted with followings belongings:  Dental Appliances: None  Vision - Corrective Lenses: Eyeglasses (Prescription eyeglasses with patient)  Hearing Aid: None  Jewelry: None  Body Piercings Removed: No  Clothing: Pants, Undergarments, Shirt, Footwear (1) pair of underwear, 2) shirts with patient. 1) pair of pants, 1) pair of shoes in patient belongings closet.)  Other Valuables: Money, Lighter/Matches, Other (Comment) (Discharge papers from previous hospital admission, 1) reciept, 1) pen, 1) pencil in patient belongings closet. 1) lighter, $0.16 in change.)     Valuables placed in safe in security envelope. Patient oriented to surroundings and program expectations and copy of patient rights given. Received admission packet. Consents reviewed, signed. Patient verbalize understanding.  Patient

## 2025-04-05 NOTE — BSMART NOTE
CRISIS NOTE: Spoke with Unit Charge Nurse, Keyur, to give report on new admission. Patient is admitted (PENDING ORDERS) to Unit: Adult 1, Room 127-01 under the care of Dr. Butler.          Radha Fine, LPC, CSAC, CADC  BSMART Counselor

## 2025-04-05 NOTE — ED PROVIDER NOTES
[facility] EMERGENCY DEPARTMENT  EMERGENCY DEPARTMENT ENCOUNTER      Pt Name: Schuyler Murphy  MRN: [id number]  Birthdate 1995  Date of evaluation: 4/4/2025  Provider: Giselle Pino MD    CHIEF COMPLAINT       Chief Complaint   Patient presents with    Mental Health Problem         HISTORY OF PRESENT ILLNESS   (Location/Symptom, Timing/Onset, Context/Setting, Quality, Duration, Modifying Factors, Severity)  Note limiting factors.   Schuyler Murphy is a 30 y.o. male who presents to the emergency department for suicidal ideations.  Patient does not have a plan for how he would kill himself he is not sure if he would go through with it but does feel like he needs help with his mental health.  Admits to using cocaine and marijuana.  Denies any alcohol use.  CT has chronic diarrhea and has about 5 bowel movements per day but this is unchanged from his baseline.  He states that he does not feel safe at the place that he was going for substance help.     Nursing Notes were reviewed.    REVIEW OF SYSTEMS    (2-9 systems for level 4, 10 or more for level 5)     Constitutional: No fever  HENT: No ear pain  Eyes: No change in vision  Respiratory: No SOB  Cardio: No chest pain  GI: No blood in stool  : No hematuria  MSK: No back pain  Skin: No rashes  Neuro: No headache    Except as noted above the remainder of the review of systems was reviewed and negative.       PAST MEDICAL HISTORY     Past Medical History:   Diagnosis Date    ADHD     Bipolar affective ([facility])     Depression     Polysubstance abuse ([facility])     Schizophrenia ([facility])     Suicide attempt ([facility])     TBI (traumatic brain injury) ([facility])          SURGICAL HISTORY     No past surgical history on file.      CURRENT MEDICATIONS       Previous Medications    ARIPIPRAZOLE (ABILIFY) 10 MG TABLET    Take 1 tablet by mouth at bedtime    BUSPIRONE (BUSPAR) 10 MG TABLET    Take 1 tablet by mouth 2 times daily    NALOXONE (NARCAN) 4 MG/0.1ML LIQD NASAL SPRAY    1

## 2025-04-05 NOTE — BSMART NOTE
CRISIS NOTE:  Spoke with ED Charge Nurse, Kianna, advised Patient is being admitted to [facility], Adult Unit 1 , Room/Bed 127-01, under Dr Butler, call [phone] for nurse to nurse report.         Radha Fine, KERI, CSAC, CADC  BSMART Counselor

## 2025-04-05 NOTE — CONSULTS
[facility] Family Medicine  FAMILY MEDICINE CONSULT NOTE FOR BEHAVIORAL HEALTH UNIT    Patient:    Schuyler Murphy , 30 y.o. male   Room/Bed:  127/01  Admission Date:   4/4/2025  Code status:  Full Code      Reason for Consult: Medical Management  Psychiatry Attending Requesting Consult: Dr. Reyes Butler    ASSESSMENT:  Schuyler Murphy is a 30 y.o. male w/ a PMH of  suicidal ideation, polysubstance abuse (cocaine, marijuana), schizophrenia, chronic diarrhea presenting for suicidal ideation who is now admitted to the [facility].    Nurse Chaperone during History and Physical: Roseann    PLAN:    Suicidal ideation  Schizophrenia  Polysubstance abuse  -UDS positive for cocaine and marijuana  -Home Rx: Abilify 10 mg nightly, BuSpar 10 mg twice daily, trazodone 100 mg nightly  -Management per inpatient psychiatry    Chronic diarrhea  -Patient denies diarrhea at this time  -As per patient, home meds include Imodium 3 times daily; however he says that he does not feel like this medication helps  Plan:  -loperamide 2mg q4hr PRN  -consider adding bismuth PRN    Mild hypokalemia  -K 3.4 in the ED   - Gave 10 mEq potassium    GERD?  -patient endorses taking pantoprazole 40mg qAM, but he does not know what he's taking it for  Plan  -pantoprazole 40mg qd    Global  Diet: Regular  Mobility: As per protocol    Thank you for this consult.         SUBJECTIVE:   Dr. Reyes Butler has consulted Family Medicine to evaluate Schuyler Murphy  in the Emergency Department. He  is a 30 y.o. male w/ PMHx of suicidal ideation, polysubstance abuse (cocaine, marijuana), schizophrenia, chronic diarrhea  presenting for suicidal ideation who is now admitted to the [facility] Unit.     ED Course:  -Vitals: Afebrile, and vital signs stable  -Labs: CMP (potassium 3.4, BUN 8, glucose 120), UDS (cocaine positive, THC positive), CBC within normal limits  -Imaging: None  -EKG: NSR  -Meds: None  -IVF: None  -Procedures:

## 2025-04-05 NOTE — BSMART NOTE
CRISIS NOTE: Spoke with Nursing Supervisor Tamela, advised of new admit to [facility], Adult 1, Room/Bed 127-01, under the care of Dr. Butler.        Radha Fine, LPC, CSAC, CADC  BSMART Counselor

## 2025-04-05 NOTE — ED TRIAGE NOTES
Pt arrived via Triage ambulatory c/o SI with no plan. Pt is low Risk. Pt denies HI. Pt states he had crack cocaine 5 days ago and Marijuana.

## 2025-04-05 NOTE — BSMART NOTE
CRISIS NOTE:  Patient ready for bed search.        Radha Fnie, KERI, CSAC, CADC  BSMART Counselor

## 2025-04-06 PROCEDURE — 99231 SBSQ HOSP IP/OBS SF/LOW 25: CPT | Performed by: PSYCHIATRY & NEUROLOGY

## 2025-04-06 PROCEDURE — 6370000000 HC RX 637 (ALT 250 FOR IP)

## 2025-04-06 PROCEDURE — 1240000000 HC EMOTIONAL WELLNESS R&B

## 2025-04-06 PROCEDURE — 6370000000 HC RX 637 (ALT 250 FOR IP): Performed by: PSYCHIATRY & NEUROLOGY

## 2025-04-06 RX ADMIN — ACETAMINOPHEN 650 MG: 325 TABLET ORAL at 17:18

## 2025-04-06 RX ADMIN — ARIPIPRAZOLE 10 MG: 10 TABLET ORAL at 20:08

## 2025-04-06 RX ADMIN — MIRTAZAPINE 15 MG: 15 TABLET, ORALLY DISINTEGRATING ORAL at 20:08

## 2025-04-06 RX ADMIN — TRAZODONE HYDROCHLORIDE 50 MG: 50 TABLET ORAL at 20:08

## 2025-04-06 ASSESSMENT — PAIN DESCRIPTION - LOCATION: LOCATION: FOOT

## 2025-04-06 ASSESSMENT — PAIN DESCRIPTION - DESCRIPTORS: DESCRIPTORS: ACHING

## 2025-04-06 ASSESSMENT — PAIN SCALES - GENERAL
PAINLEVEL_OUTOF10: 5
PAINLEVEL_OUTOF10: 0

## 2025-04-06 ASSESSMENT — PAIN DESCRIPTION - ORIENTATION: ORIENTATION: RIGHT;LEFT

## 2025-04-07 PROBLEM — F25.9 SCHIZOAFFECTIVE DISORDER (HCC): Status: RESOLVED | Noted: 2025-04-05 | Resolved: 2025-04-07

## 2025-04-07 LAB
EKG ATRIAL RATE: 82 BPM
EKG DIAGNOSIS: NORMAL
EKG P AXIS: 64 DEGREES
EKG P-R INTERVAL: 162 MS
EKG Q-T INTERVAL: 362 MS
EKG QRS DURATION: 104 MS
EKG QTC CALCULATION (BAZETT): 422 MS
EKG R AXIS: 82 DEGREES
EKG T AXIS: 60 DEGREES
EKG VENTRICULAR RATE: 82 BPM

## 2025-04-07 PROCEDURE — 6370000000 HC RX 637 (ALT 250 FOR IP)

## 2025-04-07 PROCEDURE — 1240000000 HC EMOTIONAL WELLNESS R&B

## 2025-04-07 PROCEDURE — 99232 SBSQ HOSP IP/OBS MODERATE 35: CPT | Performed by: PSYCHIATRY & NEUROLOGY

## 2025-04-07 PROCEDURE — 93010 ELECTROCARDIOGRAM REPORT: CPT | Performed by: INTERNAL MEDICINE

## 2025-04-07 PROCEDURE — 6370000000 HC RX 637 (ALT 250 FOR IP): Performed by: STUDENT IN AN ORGANIZED HEALTH CARE EDUCATION/TRAINING PROGRAM

## 2025-04-07 PROCEDURE — 6370000000 HC RX 637 (ALT 250 FOR IP): Performed by: PSYCHIATRY & NEUROLOGY

## 2025-04-07 RX ADMIN — ARIPIPRAZOLE 10 MG: 10 TABLET ORAL at 19:55

## 2025-04-07 RX ADMIN — PANTOPRAZOLE SODIUM 40 MG: 40 TABLET, DELAYED RELEASE ORAL at 08:10

## 2025-04-07 RX ADMIN — MIRTAZAPINE 15 MG: 15 TABLET, ORALLY DISINTEGRATING ORAL at 19:55

## 2025-04-07 RX ADMIN — ACETAMINOPHEN 650 MG: 325 TABLET ORAL at 18:21

## 2025-04-07 RX ADMIN — TRAZODONE HYDROCHLORIDE 50 MG: 50 TABLET ORAL at 19:55

## 2025-04-07 ASSESSMENT — PAIN DESCRIPTION - ORIENTATION: ORIENTATION: MID

## 2025-04-07 ASSESSMENT — PAIN DESCRIPTION - LOCATION: LOCATION: STERNUM

## 2025-04-07 ASSESSMENT — PAIN SCALES - GENERAL
PAINLEVEL_OUTOF10: 0
PAINLEVEL_OUTOF10: 5

## 2025-04-07 ASSESSMENT — PAIN DESCRIPTION - DESCRIPTORS: DESCRIPTORS: SORE

## 2025-04-07 NOTE — BH NOTE
The  went to complete the Psychosocial assessment with the patient at 10:30 am on 04/07/2025. The patient was lying down when the  entered the room. The patient reported that he would like the  to come back later as he was tired and wanted to rest.      LUCIEN Benjamin, Supervisee In Social Work                The  went to complete the Psychosocial assessment with the patient at 10:45 am. When the  went in the room, the  called out the patients name and was told to come back later.     LUCIEN Benjamin, Supervisee In Social Work

## 2025-04-07 NOTE — GROUP NOTE
Group Therapy Note    Date: 4/7/2025    Group Start Time: 1500  Group End Time: 1545  Group Topic: Music Therapy      Nneka Wyman        Group Therapy Note    Attendees: 2/6    Group Focus: Music therapy group explored topics related to anxiety, rumination, mindfulness, and cognitive defusion through jd analysis. Group members then listened to and discussed songs that are meaningful to them. The group may also promote use of music as a coping skill.       Notes:  Pt engaged in discussion related to music and his interests. Pt stated he was being \"weird\" leading to hospitalization. Pt referred to himself as an addict and discussed how addicts shouldn't be the ones supporting their family. Pt's thought process was difficult to follow at times.    Status After Intervention:  Unchanged    Participation Level: Interactive    Participation Quality: Appropriate, Attentive, and Sharing      Speech:  normal and mumbled at times      Thought Process/Content: Logical at times, difficult to follow at times      Affective Functioning: Congruent      Mood: euthymic      Level of consciousness:  Alert and Attentive      Response to Learning: Able to verbalize current knowledge/experience      Endings: None Reported    Modes of Intervention: Support, Socialization, Exploration, and Media      Discipline Responsible: /Counselor      Signature:  JESSICA Dunn, LPC  Board-Certified Music Therapist  Licensed Professional Counselor
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
## 2025-04-07 NOTE — BH NOTE
The patient slept for approximately 7 hours during this shift with no issues observed. Continue to monitor.

## 2025-04-07 NOTE — GROUP NOTE
Art Therapy Group Progress Note     PATIENT SCHEDULED FOR GROUP AT:  1:10 PM     GROUP STOP TIME:  2:00 PM      ATTENDANCE:   High (4/5 participants)     TOPIC / FOCUS: Draw an Emotion as a Character      GOALS:  Emotion Identification, modeling emotional communication skills, reflect on emotions and reactions to emotions, identify coping skills and provide alternative coping options, encourage self-expression, support creativity, encourage emotional management tools       Notes:  Pt had difficulty identifying an emotion. Pt marie a stick figure, with a balloon like head, big eyes, and clenched teeth. Pt identified the emotion as shock. Pt shared a story of walking on wet cement and it not sticking to their feet, which made them feel shocked.Pt made bizarre contributions to group discussion, which were difficult to follow at times.      Status After Intervention:  Unchanged    Participation Level: Interactive    Participation Quality: Appropriate and Sharing      Speech:  normal      Thought Process/Content: Delusional      Affective Functioning: Congruent      Mood: euthymic      Level of consciousness:  Alert      Response to Learning: Able to verbalize current knowledge/experience      Endings: None Reported    Modes of Intervention: Education, Support, Socialization, Exploration, Clarifying, and Problem-solving      Discipline Responsible: Psychoeducational Specialist      Jenaro Guerra   Art Therapist, MA, ATR-P   Provisional Registered Art Therapist        none

## 2025-04-08 PROCEDURE — 6370000000 HC RX 637 (ALT 250 FOR IP): Performed by: STUDENT IN AN ORGANIZED HEALTH CARE EDUCATION/TRAINING PROGRAM

## 2025-04-08 PROCEDURE — 99232 SBSQ HOSP IP/OBS MODERATE 35: CPT | Performed by: PSYCHIATRY & NEUROLOGY

## 2025-04-08 PROCEDURE — 1240000000 HC EMOTIONAL WELLNESS R&B

## 2025-04-08 PROCEDURE — 6370000000 HC RX 637 (ALT 250 FOR IP): Performed by: PSYCHIATRY & NEUROLOGY

## 2025-04-08 RX ADMIN — MIRTAZAPINE 15 MG: 15 TABLET, ORALLY DISINTEGRATING ORAL at 20:24

## 2025-04-08 RX ADMIN — ARIPIPRAZOLE 10 MG: 10 TABLET ORAL at 20:24

## 2025-04-08 RX ADMIN — PANTOPRAZOLE SODIUM 40 MG: 40 TABLET, DELAYED RELEASE ORAL at 08:44

## 2025-04-08 NOTE — GROUP NOTE
Art Therapy Group Progress Note     PATIENT SCHEDULED FOR GROUP AT:  1:10 PM     GROUP STOP TIME:  2:00 PM      ATTENDANCE:   High (4/6 participants)     TOPIC / FOCUS:  Over the [place]      GOALS:  identify obstacle or challenge to overcome, label goal, identify coping skills to support goals, encourage interpersonal awareness, promote positive coping skills, encourage creativity, increase problem-solving skills, encourage goal-oriented thinking, promote personal accountability, identify actionable goals     Notes: Pt engaged in coloring their rainbow. Pt was unable to identify a goal or obstacles. Pt shared during discussion, often off-topic thoughts.     Status After Intervention:  Unchanged    Participation Level: Interactive    Participation Quality: Appropriate, Attentive, and Sharing      Speech:  normal      Thought Process/Content: Disorganized, loose       Affective Functioning: Congruent      Mood: euthymic      Level of consciousness:  Alert and Attentive      Response to Learning: Able to verbalize current knowledge/experience      Endings: None Reported    Modes of Intervention: Education, Support, Socialization, Exploration, and Clarifying      Discipline Responsible: Psychoeducational Specialist      Jenaro Guerra   Art Therapist, MA, ATR-P   Provisional Registered Art Therapist

## 2025-04-08 NOTE — GROUP NOTE
Group Therapy Note    Date: 4/8/2025    Group Start Time: 0930  Group End Time: 1030  Group Topic: Music Therapy      Nneka Wyman        Group Therapy Note    Attendees: 2/5    Group Focus: Music therapy group consisted of songwriting and/or poem writing utilizing the technique of song collage. Group members selected lyrics and words from existing songs to create their own songs, raps, or poetry. Group members worked individually and then were given the opportunity to share. The group may promote creativity and use of music, writing, and the creative arts as outlets for self-expression.        Notes:  Pt engaged in group through creating his own poem utilizing jd collage materials. Pt engaged in discussion with therapist and peer. Pt's thought process was difficult to follow and unrelated to topics being discussed.    Status After Intervention:  Unchanged    Participation Level: Interactive    Participation Quality: Appropriate, Attentive, and Sharing      Speech:  normal      Thought Process/Content: Disorganized      Affective Functioning: Congruent      Mood: euthymic      Level of consciousness:  Alert and Attentive      Response to Learning: Able to verbalize current knowledge/experience      Endings: None Reported    Modes of Intervention: Support, Socialization, Exploration, and Media      Discipline Responsible: /Counselor      Signature:  JESSICA Dunn, LPC  Board-Certified Music Therapist  Licensed Professional Counselor

## 2025-04-08 NOTE — GROUP NOTE
Group Therapy Note    Date: 4/8/2025    Group Start Time: 1500  Group End Time: 1545  Group Topic: Recreational    [facility]    Suyapa Spivey        Group Therapy Note    Attendees: 3/6    Group: Positive affirmations    Focus: Recreational therapy group focused on creating/practicing a positive mindset. Patients created an affirmation sunshine, identifying and writing down their qualities and strengths in the sun rays. Group members then discussed which quality they considered their strongest and which quality they can improve. This group may help promote positivity and motivation, build self-confidence, reduce stress and anxiety, and improve mood.            Notes: The patient reported being grateful for \"being alive\". Patient presented in a pleasant mood. Patient stated difficulties reading/seeing the handout, therapist provided assistance.  Patient shared his strengths.    Status After Intervention:  Unchanged    Participation Level: Active Listener and Interactive    Participation Quality: Appropriate, Attentive, and Sharing      Speech:  normal      Thought Process/Content: Logical      Affective Functioning: Congruent      Mood: euthymic      Level of consciousness:  Alert and Attentive      Response to Learning: Able to verbalize current knowledge/experience      Endings: None Reported    Modes of Intervention: Socialization, Clarifying, and Activity      Recreational Therapist  SUYAPA SPIVEY
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
## 2025-04-09 PROCEDURE — 1240000000 HC EMOTIONAL WELLNESS R&B

## 2025-04-09 PROCEDURE — 6370000000 HC RX 637 (ALT 250 FOR IP): Performed by: PSYCHIATRY & NEUROLOGY

## 2025-04-09 PROCEDURE — 6370000000 HC RX 637 (ALT 250 FOR IP): Performed by: STUDENT IN AN ORGANIZED HEALTH CARE EDUCATION/TRAINING PROGRAM

## 2025-04-09 PROCEDURE — 99231 SBSQ HOSP IP/OBS SF/LOW 25: CPT | Performed by: PSYCHIATRY & NEUROLOGY

## 2025-04-09 PROCEDURE — 6370000000 HC RX 637 (ALT 250 FOR IP)

## 2025-04-09 RX ORDER — VITAMIN B COMPLEX
1000 TABLET ORAL DAILY
Status: DISCONTINUED | OUTPATIENT
Start: 2025-04-09 | End: 2025-04-10 | Stop reason: HOSPADM

## 2025-04-09 RX ORDER — MULTIVITAMIN WITH IRON
1 TABLET ORAL DAILY
Status: DISCONTINUED | OUTPATIENT
Start: 2025-04-09 | End: 2025-04-10 | Stop reason: HOSPADM

## 2025-04-09 RX ADMIN — Medication 1000 UNITS: at 14:04

## 2025-04-09 RX ADMIN — MULTIVITAMIN TABLET 1 TABLET: TABLET at 14:04

## 2025-04-09 RX ADMIN — ARIPIPRAZOLE 10 MG: 10 TABLET ORAL at 20:25

## 2025-04-09 RX ADMIN — MIRTAZAPINE 15 MG: 15 TABLET, ORALLY DISINTEGRATING ORAL at 20:25

## 2025-04-09 RX ADMIN — PANTOPRAZOLE SODIUM 40 MG: 40 TABLET, DELAYED RELEASE ORAL at 07:59

## 2025-04-09 RX ADMIN — ACETAMINOPHEN 650 MG: 325 TABLET ORAL at 19:53

## 2025-04-09 RX ADMIN — TRAZODONE HYDROCHLORIDE 50 MG: 50 TABLET ORAL at 20:25

## 2025-04-09 ASSESSMENT — PAIN SCALES - GENERAL
PAINLEVEL_OUTOF10: 0
PAINLEVEL_OUTOF10: 3

## 2025-04-09 ASSESSMENT — PAIN SCALES - WONG BAKER: WONGBAKER_NUMERICALRESPONSE: NO HURT

## 2025-04-09 ASSESSMENT — PAIN DESCRIPTION - LOCATION: LOCATION: HEAD

## 2025-04-09 ASSESSMENT — PAIN DESCRIPTION - DESCRIPTORS: DESCRIPTORS: ACHING

## 2025-04-09 NOTE — GROUP NOTE
Group Therapy Note    Date: 4/9/2025    Group Start Time: 1500  Group End Time: 1545  Group Topic: Recreational    [facility]    Suyapa Spivey        Group Therapy Note    Attendees: 4/7        Group: Name Qualities & Strengths    Focus: Recreational therapy group engaged patients in a group to help them focus on \"positivity\". Patients used the letters in their names to identify their positive qualities, talents, or strengths. This group may provide self-awareness, boosts confidence, help with decision making skills, and promote growth.            Notes:  Patient was engaged. Patient had difficulties identifying qualities and strengths associated with his name. The patient left the group at one point to meet with . Patient identified assertive as a strength, shared he is working on being more \"assertive\".       Status After Intervention:  Unchanged    Participation Level: Active Listener and Interactive    Participation Quality: Appropriate, Attentive, and Sharing      Speech:  normal      Thought Process/Content: Logical      Affective Functioning: Congruent      Mood: euthymic      Level of consciousness:  Alert      Response to Learning: Able to verbalize current knowledge/experience      Endings: None Reported    Modes of Intervention: Socialization and Activity      Recreational Therapist  SUYAPA SPIVEY

## 2025-04-09 NOTE — GROUP NOTE
Art Therapy Group Progress Note     PATIENT SCHEDULED FOR GROUP AT:  1:10 PM     GROUP STOP TIME:  1:50 PM      ATTENDANCE:   Low (1/6 participants)     TOPIC / FOCUS: Coping Shield      GOALS:  increase self-awareness, promote effective coping skills, increase feelings of autonomy, encourage creative problem solving and building resilience, increase focus, encourage creativity, promote positive coping skills      Notes:  Pt engaged in discussion about coping skills. Pt was able to identify many coping skills through prompted questions including fidget cubes, listening to the radio, working out, and eating healthy. Pt also shared about other topics including driving, using drugs and drinking, the death of their father and brother sometime last year, and experiencing paranoia. At times Pt was logical, others mildly disorganized/loose.     Status After Intervention:  Unchanged    Participation Level: Active Listener and Interactive    Participation Quality: Appropriate, Attentive, and Sharing      Speech:  normal      Thought Process/Content: Logical, mildly disorganized/loose       Affective Functioning: Congruent      Mood:  bright      Level of consciousness:  Alert and Attentive      Response to Learning: Able to verbalize current knowledge/experience      Endings: None Reported    Modes of Intervention: Education, Support, Socialization, Exploration, and Clarifying      Discipline Responsible: Psychoeducational Specialist    Jenaro Guerra   Art Therapist, MA, ATR-P   Provisional Registered Art Therapist

## 2025-04-09 NOTE — GROUP NOTE
Group Therapy Note    Date: 4/9/2025    Group Start Time: 1400  Group End Time: 1455  Group Topic: Music Therapy      Nneka Wyman        Group Therapy Note    Attendees: 2/6    Group Focus: Music therapy group focused on the topics of self-care, resilience, and self-compassion. The group utilized jd analysis and songwriting to identify ways group members can care for themselves while promoting creativity. Therapist also provided psychoeducation relating to domains and types of self-care. The group also added instrumentation and sang back the group's newly created song to support the group process. Group members were also encouraged to work on self-care assessment worksheets as homework.       Notes:  Pt presented with bright affect. Pt presented as tangential with his thought process being difficult to follow. Pt discussed his family and someone he worked for. Pt identified taking care of his teeth including using whitening strips as a form of self-care. Pt engaged in music making with moderate energy.    Status After Intervention:  Unchanged    Participation Level: Interactive    Participation Quality: Appropriate, Attentive, and Sharing      Speech:  normal      Thought Process/Content: Tangential      Affective Functioning: Congruent      Mood: euthymic      Level of consciousness:  Alert and Attentive      Response to Learning: Able to verbalize current knowledge/experience      Endings: None Reported    Modes of Intervention: Education, Support, Socialization, Exploration, and Media      Discipline Responsible: /Counselor      Signature:  JESSICA Dunn, LPC  Board-Certified Music Therapist  Licensed Professional Counselor
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
## 2025-04-10 VITALS
RESPIRATION RATE: 16 BRPM | SYSTOLIC BLOOD PRESSURE: 116 MMHG | OXYGEN SATURATION: 98 % | HEART RATE: 66 BPM | WEIGHT: 195 LBS | DIASTOLIC BLOOD PRESSURE: 67 MMHG | TEMPERATURE: 98.1 F | BODY MASS INDEX: 25.84 KG/M2 | HEIGHT: 73 IN

## 2025-04-10 PROCEDURE — 6370000000 HC RX 637 (ALT 250 FOR IP): Performed by: PSYCHIATRY & NEUROLOGY

## 2025-04-10 PROCEDURE — 6370000000 HC RX 637 (ALT 250 FOR IP): Performed by: STUDENT IN AN ORGANIZED HEALTH CARE EDUCATION/TRAINING PROGRAM

## 2025-04-10 PROCEDURE — 6370000000 HC RX 637 (ALT 250 FOR IP)

## 2025-04-10 RX ORDER — VITAMIN B COMPLEX
1000 TABLET ORAL DAILY
Qty: 30 TABLET | Refills: 0 | Status: SHIPPED | OUTPATIENT
Start: 2025-04-11

## 2025-04-10 RX ORDER — PANTOPRAZOLE SODIUM 40 MG/1
40 TABLET, DELAYED RELEASE ORAL DAILY
Qty: 30 TABLET | Refills: 0 | Status: SHIPPED | OUTPATIENT
Start: 2025-04-11

## 2025-04-10 RX ORDER — MIRTAZAPINE 15 MG/1
15 TABLET, FILM COATED ORAL NIGHTLY
Qty: 30 TABLET | Refills: 0 | Status: SHIPPED | OUTPATIENT
Start: 2025-04-10

## 2025-04-10 RX ORDER — TRAZODONE HYDROCHLORIDE 50 MG/1
50 TABLET ORAL NIGHTLY
Qty: 30 TABLET | Refills: 0 | Status: SHIPPED | OUTPATIENT
Start: 2025-04-10

## 2025-04-10 RX ORDER — ARIPIPRAZOLE 10 MG/1
10 TABLET ORAL
Qty: 30 TABLET | Refills: 0 | Status: SHIPPED | OUTPATIENT
Start: 2025-04-10

## 2025-04-10 RX ORDER — MIRTAZAPINE 15 MG/1
15 TABLET, FILM COATED ORAL NIGHTLY
Status: DISCONTINUED | OUTPATIENT
Start: 2025-04-10 | End: 2025-04-10 | Stop reason: HOSPADM

## 2025-04-10 RX ADMIN — PANTOPRAZOLE SODIUM 40 MG: 40 TABLET, DELAYED RELEASE ORAL at 08:19

## 2025-04-10 RX ADMIN — LOPERAMIDE HYDROCHLORIDE 2 MG: 2 CAPSULE ORAL at 13:50

## 2025-04-10 RX ADMIN — Medication 1000 UNITS: at 08:19

## 2025-04-10 RX ADMIN — MULTIVITAMIN TABLET 1 TABLET: TABLET at 08:22
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
## 2025-04-10 NOTE — PLAN OF CARE
Problem: Depression  Goal: Will be euthymic at discharge  Description: INTERVENTIONS:  1. Administer medication as ordered  2. Provide emotional support via 1:1 interaction with staff  3. Encourage involvement in milieu/groups/activities  4. Monitor for social isolation  4/6/2025 1012 by Farnaz Ramirez RN  Outcome: Progressing     Problem: Psychosis  Goal: Will report no hallucinations or delusions  Description: INTERVENTIONS:  1. Administer medication as  ordered  2. Assist with reality testing to support increasing orientation  3. Assess if patient's hallucinations or delusions are encouraging self harm or harm to others and intervene as appropriate  4/6/2025 1012 by Farnaz Ramirez RN  Outcome: Not Progressing   Pt. is visible in the milieu. He is pleasant and compliant. He is free from falls, self harm or harming others. Will continue to monitor for safety and provide support as needed.  
  Problem: Depression  Goal: Will be euthymic at discharge  Description: INTERVENTIONS:  1. Administer medication as ordered  2. Provide emotional support via 1:1 interaction with staff  3. Encourage involvement in milieu/groups/activities  4. Monitor for social isolation  Outcome: Not Progressing    Patient observed on the unit interacting well with staff and peers.  Patient complaint with vital signs, medication administration and meals.  Patient denied any suicidal/homicidal ideation, intent or plan, thoughts of self-harm or harming others or A/V hallucinations.  Patient with complaint of 5/10 pain to sternum area which he stated is caused from working out every day and riding bikes.  Patient stated he feels the pain only when he stretches.  Tylenol 650mg given and [facility] will be notified when they do their rounds.  Patient stated he was worrying about his living situation and does not want to go to a drug program.  Patient attended groups and participated well.  Will continue to monitor.     
  Problem: Depression  Goal: Will be euthymic at discharge  Description: INTERVENTIONS:  1. Administer medication as ordered  2. Provide emotional support via 1:1 interaction with staff  3. Encourage involvement in milieu/groups/activities  4. Monitor for social isolation  Outcome: Progressing     Problem: Psychosis  Goal: Will report no hallucinations or delusions  Description: INTERVENTIONS:  1. Administer medication as  ordered  2. Assist with reality testing to support increasing orientation  3. Assess if patient's hallucinations or delusions are encouraging self harm or harm to others and intervene as appropriate  Outcome: Progressing   Pt composed and withdrawn with bright, polite affect. Pt states anxiety 5/10 without specific trigger. Pt states depression 5/10 r/t to being estranged from family/lacking family supports. Pt states, \"I guess you could say I'm depressed. I don't have family support.\" Pt denies SI/HI/AH/CH/VH/thoughts of self-harm in conversation with this RN. Pt states goal for today is \"Change the payee. She's not giving me a break. She keeps spending my money on her stuff.\" Pt reports  \"new\" L ear tinnitus in 1:1 with this RN. Later in Tx team, Pt states has UYEN ear tinnitus exacerbated by high pitched noise and loud sounds that started when Pt was a child. Pt is poor historian and requires lots of repetition and rephrasing before Pt can give an answer that can often be vague. Pt attended a group with encouragement by this RN. Pt attended AM group without extra encouragement. Pt polite and friendly with peers with appropriate boundaries when out of room. Pt shows marked improvement in mood when encouraged to socialize. Pt provided with several coloring pages.     
  Problem: Self Harm/Suicidality  Goal: Will have no self-injury during hospital stay  Description: INTERVENTIONS:  1.  Ensure constant observer at bedside with Q15M safety checks  2.  Maintain a safe environment  3.  Secure patient belongings  4.  Ensure family/visitors adhere to safety recommendations  5.  Ensure safety tray has been added to patient's diet order  6.  Every shift and PRN: Re-assess suicidal risk via Frequent Screener    4/6/2025 1012 by Farnaz Ramirez RN  Outcome: Progressing  4/6/2025 0557 by Dee Luong RN  Outcome: Progressing     Problem: Depression  Goal: Will be euthymic at discharge  Description: INTERVENTIONS:  1. Administer medication as ordered  2. Provide emotional support via 1:1 interaction with staff  3. Encourage involvement in milieu/groups/activities  4. Monitor for social isolation  4/6/2025 1012 by Farnaz Ramirez RN  Outcome: Progressing  4/6/2025 0557 by Dee Luong RN  Outcome: Progressing     Problem: Behavior  Goal: Pt/Family maintain appropriate behavior and adhere to behavioral management agreement, if implemented  Description: INTERVENTIONS:  1. Assess patient/family's coping skills and  non-compliant behavior (including use of illegal substances)  2. Notify security of behavior or suspected illegal substances which indicate the need for search of the family and/or belongings  3. Encourage verbalization of thoughts and concerns in a socially appropriate manner  4. Utilize positive, consistent limit setting strategies supporting safety of patient, staff and others  5. Encourage participation in the decision making process about the behavioral management agreement  6. If a visitor's behavior poses a threat to safety call refer to organization policy.  7. Initiate consult with , Psychosocial CNS, Spiritual Care as appropriate  4/6/2025 1012 by Farnaz Ramirez RN  Outcome: Progressing  4/6/2025 0557 by Dee Luong RN  Outcome: Progressing   
  Problem: Self Harm/Suicidality  Goal: Will have no self-injury during hospital stay  Description: INTERVENTIONS:  1.  Ensure constant observer at bedside with Q15M safety checks  2.  Maintain a safe environment  3.  Secure patient belongings  4.  Ensure family/visitors adhere to safety recommendations  5.  Ensure safety tray has been added to patient's diet order  6.  Every shift and PRN: Re-assess suicidal risk via Frequent Screener    Outcome: Progressing     Problem: Depression  Goal: Will be euthymic at discharge  Description: INTERVENTIONS:  1. Administer medication as ordered  2. Provide emotional support via 1:1 interaction with staff  3. Encourage involvement in milieu/groups/activities  4. Monitor for social isolation  Outcome: Progressing     Problem: Psychosis  Goal: Will report no hallucinations or delusions  Description: INTERVENTIONS:  1. Administer medication as  ordered  2. Assist with reality testing to support increasing orientation  3. Assess if patient's hallucinations or delusions are encouraging self harm or harm to others and intervene as appropriate  Outcome: Progressing     Problem: Behavior  Goal: Pt/Family maintain appropriate behavior and adhere to behavioral management agreement, if implemented  Description: INTERVENTIONS:  1. Assess patient/family's coping skills and  non-compliant behavior (including use of illegal substances)  2. Notify security of behavior or suspected illegal substances which indicate the need for search of the family and/or belongings  3. Encourage verbalization of thoughts and concerns in a socially appropriate manner  4. Utilize positive, consistent limit setting strategies supporting safety of patient, staff and others  5. Encourage participation in the decision making process about the behavioral management agreement  6. If a visitor's behavior poses a threat to safety call refer to organization policy.  7. Initiate consult with , Psychosocial 
Patient observed on the unit interacting well with staff and peers.  Patient complaint with vital signs, medication administration and meals.  Patient denied any suicidal/homicidal ideation, intent or plan, thoughts of self-harm or harming others, A/V hallucinations, pain or discomfort.  Patient attended groups and participated well.  Will continue to monitor.  
Assist patient/family to overcome blocks to healing by use of spiritual practices (prayer, meditation, guided imagery, reiki, breath work, etc).  2. De-myth guilt and help patient/family identify possible irrational spiritual/cultural beliefs and values.  3. Explore possibilities of making amends & reconciliation with self, others, and/or a greater power.  4. Guide patient/family in identifying painful feelings of guilt.  5. Help patient/famiy explore and identify spiritual beliefs, cultural understandings or values that may help or hinder letting go of issue.  6. Help patient/family explore feelings of anger, bitterness, resentment.  7. Help patient/family identify and examine the situation in which these feelings are experienced.  8. Help patient/family identify destructive displacement of feelings onto other individuals.  9. Invite use of sacraments/rituals/ceremonies as appropriate (e.g. - confession, anointing, smudging).  10. Refer patient/family to formal counseling and/or to [facility] for further support work.  Outcome: Adequate for Discharge     Problem: Pain  Goal: Verbalizes/displays adequate comfort level or baseline comfort level  Outcome: Adequate for Discharge     
Progressing   The patient was sitting in the day area with peers; he was quiet and had no interaction with anyone. The patient verbalized having a good day. He describes his mood as “pretty good.” Pt didn’t have a specific goal for today. The patient is eating well and taking medication without any issue. He  denies all.   
safety of patient, staff and others  5. Encourage participation in the decision making process about the behavioral management agreement  6. If a visitor's behavior poses a threat to safety call refer to organization policy.  7. Initiate consult with , Psychosocial CNS, Spiritual Care as appropriate  4/5/2025 1258 by Farnaz Ramirez RN  Outcome: Progressing  4/5/2025 0650 by Pal You RN  Outcome: Progressing     Problem: Anxiety  Goal: Will report anxiety at manageable levels  Description: INTERVENTIONS:  1. Administer medication as ordered  2. Teach and rehearse alternative coping skills  3. Provide emotional support with 1:1 interaction with staff  4/5/2025 1258 by Farnaz Ramirez RN  Outcome: Progressing  4/5/2025 0650 by Pal You RN  Outcome: Progressing     Problem: Self Care Deficit  Goal: Return ADL status to a safe level of function  Description: INTERVENTIONS:  1. Administer medication as ordered  2. Assess ADL deficits and provide assistive devices as needed  3. Obtain PT/OT consults as needed  4. Assist and instruct patient to increase activity and self care as tolerated  4/5/2025 1258 by Farnaz Ramirez RN  Outcome: Progressing  4/5/2025 0650 by Pal You RN  Outcome: Progressing     Pt presents with flat affect, depressed mood, with a constricted thought process. Pt has been withdrawn to self on the unit, spent most of the day in his room. Pt displays appropriate boundaries on the unit, adhering to unit guidelines. Pt reports adequate sleep and appetite. Pt appears well-groomed. Pt denies HI at this time, however patient endorses SI without a plan and AVH of his dad and brother. Patient does not describe these voices as commanding. Pt is medication compliant. Will continue to monitor/treat as clinically indicated.  
skills  3. Provide emotional support with 1:1 interaction with staff  4/9/2025 2054 by Odalys Guevara, RN  Outcome: Progressing   The patient has been cooperative and pleasant during this shift, demonstrating appropriate interactions with both staff and peers. He denies any thoughts of self-harm or harm to others, has eaten meals and snacks, and is compliant with his medications. Will monitor.   
at this time. Cooperative and compliant with medication. No behavioral issues noted at this time. Q15 minute checks ongoing. Staff will continue to maintain a safe and therapeutic environment.  
or hinder letting go of issue.  6. Help patient/family explore feelings of anger, bitterness, resentment.  7. Help patient/family identify and examine the situation in which these feelings are experienced.  8. Help patient/family identify destructive displacement of feelings onto other individuals.  9. Invite use of sacraments/rituals/ceremonies as appropriate (e.g. - confession, anointing, smudging).  10. Refer patient/family to formal counseling and/or to [facility] for further support work.  4/8/2025 0134 by Dee Luong RN  Outcome: Progressing  4/7/2025 1802 by Marlen Weldon RN  Outcome: Progressing     Problem: Pain  Goal: Verbalizes/displays adequate comfort level or baseline comfort level  4/7/2025 1802 by Marlen Weldon RN  Outcome: Progressing     Problem: Depression  Goal: Will be euthymic at discharge  Description: INTERVENTIONS:  1. Administer medication as ordered  2. Provide emotional support via 1:1 interaction with staff  3. Encourage involvement in milieu/groups/activities  4. Monitor for social isolation  4/8/2025 0134 by Dee Luong RN  Outcome: Progressing  4/7/2025 1802 by Marlen Weldon RN  Outcome: Not Progressing     Pt is alert and oriented x4. Denies SI/HI and AVH at this time. Cooperative and compliant with medication. No behavioral issues noted at this time. Q15 minute checks ongoing. Staff will continue to maintain a safe and therapeutic environment.

## 2025-04-10 NOTE — GROUP NOTE
Group Therapy Note    Date: 4/10/2025    Group Start Time: 1500  Group End Time: 1545  Group Topic: Recreational    [facility]    Anupama Spivey        Group Therapy Note    Attendees: 5/6    Group Type: Game-Storee    Group Focus: This recreational therapy group focused on relationship building and support. Patients discussed and answered a variety of questions related to emotions, future imagining, resilience, optimism, and gratitude. The group may help enhance emotional competence, self-awareness/compassion, mood, communications skills, and decrease stress.            Notes: Patient engaged in group check-in. Patient was discharged during group.   Recreational Therapist   Anupama Spivey

## 2025-04-10 NOTE — DISCHARGE INSTRUCTIONS
BEHAVIORAL HEALTH NURSING DISCHARGE NOTE      The personal items collected during your admission are returned to you:     PATIENT INSTRUCTIONS:    What to do at Home    You may not operate a vehicle for 24-hours.    You may not engage in an occupation involving machinery or appliances.    You may not drink any alcoholic beverages during the next 48-hours.    You may resume normal activities.    Avoid making any critical decisions for at least 24-hours.    Recommended diet: regular diet    Recommended activity: activity as tolerated    You are referred to Other.    Follow-up with Patient will discharge to [facility] on 4/10/2025. Patient will travel [id number] [facility]. [place] [id number]. Patient will travel with [employer]([phone]).Transportation is scheduled for 230pm however the company will contact the providers and call back with details.     Confirmation Number-[id number]    [facility]  [phone]. If you have problems relating to your recovery, call your physician.    The discharge information has been reviewed with the patient.  The patient verbalized understanding.

## 2025-04-10 NOTE — GROUP NOTE
Art Therapy Group Progress Note     PATIENT SCHEDULED FOR GROUP AT:  1:00 PM     GROUP STOP TIME:  1:45 PM      ATTENDANCE:   Low (3/6 participants)     TOPIC / FOCUS:  Gratitude tree       GOALS:  define gratitude, identify things to be grateful for, increase positive feelings, decrease stress, increase emotional communication, increase self-awareness, practice gratitude, encourage creativity, encourage positive thinking, encourage focus, encourage gratitude practice and positive coping skills      Notes:  Pt was engaged and focused in art making. Pt expressed gratitude for mobility, TV, ability to speak, and kiwis. Pt contributed to group logically and on topic at times, while other times making loose associations or spontaneously sharing about special interests.     Status After Intervention:  Unchanged    Participation Level: Active Listener and Interactive    Participation Quality: Appropriate, Attentive, and Sharing      Speech:  normal      Thought Process/Content: Logical, loose associations       Affective Functioning: Congruent      Mood:  bright      Level of consciousness:  Alert and Attentive      Response to Learning: Able to verbalize current knowledge/experience      Endings: None Reported    Modes of Intervention: Education, Support, Socialization, and Exploration      Discipline Responsible: Psychoeducational Specialist      Jenaro Guerra   Art Therapist, MA, ATR-P   Provisional Registered Art Therapist

## 2025-04-11 NOTE — DISCHARGE SUMMARY
Behavioral Health Discharge Note    Admit Date: 4/4/2025  Hospital day 5  Date of discharge February 10, 2025.      Vitals : No data found.    Labs:  No results found for this or any previous visit (from the past 24 hours).  Meds:   No current facility-administered medications for this encounter.     Current Outpatient Medications   Medication Sig    mirtazapine (REMERON) 15 MG tablet Take 1 tablet by mouth nightly    traZODone (DESYREL) 50 MG tablet Take 1 tablet by mouth nightly    ARIPiprazole (ABILIFY) 10 MG tablet Take 1 tablet by mouth nightly    pantoprazole (PROTONIX) 40 MG tablet Take 1 tablet by mouth daily    Vitamin D (CHOLECALCIFEROL) 25 MCG (1000 UT) TABS tablet Take 1 tablet by mouth daily      Hospital Problems: Principal Problem:    Undifferentiated schizophrenia (HCC)  Active Problems:    Traumatic brain injury (HCC)    Mild intellectual disabilities    Cocaine use disorder, severe, dependence (HCC)    Neurocognitive disorder    GERD (gastroesophageal reflux disease)  Resolved Problems:    * No resolved hospital problems. *      Subjective:   Medication side effects: none      Mental Status Exam  Sensorium: alert  Orientation: only aware of place and person  Relations: guarded and unreliable  Eye Contact: poor  Appearance: is bizarre  Thought Process: simple , often confused and circumstantial    Thought Content: confusion   Suicidal: denies   Homicidal: denies   Mood: is elevated  Affect: strange  Memory: is impaired, is recent, and is remote     Concentration: distractable  Abstraction: concrete  Insight: The patient shows no insight    OR Poor  Judgement: is cognitively impaired OR  Poor    Assessment/Plan:   not changed    Admission assessment as per Dr. Weiner:         PSYCHIATRIC H&P      IDENTIFYING INFORMATION: Schuyler Murphy a 30-year-old male with a history of psychotic disorder and substance use admitted from [facility]'s emergency department. He was admitted to the

## 2025-05-03 ENCOUNTER — HOSPITAL ENCOUNTER (EMERGENCY)
Facility: HOSPITAL | Age: 30
Discharge: HOME | End: 2025-05-03
Payer: COMMERCIAL

## 2025-05-03 VITALS
BODY MASS INDEX: 25.84 KG/M2 | HEIGHT: 73 IN | SYSTOLIC BLOOD PRESSURE: 122 MMHG | TEMPERATURE: 98.3 F | WEIGHT: 195 LBS | RESPIRATION RATE: 16 BRPM | DIASTOLIC BLOOD PRESSURE: 78 MMHG | HEART RATE: 89 BPM | OXYGEN SATURATION: 100 %

## 2025-05-03 DIAGNOSIS — Z59.00 HOMELESSNESS: Primary | ICD-10-CM

## 2025-05-03 LAB
AMPHET UR QL SCN: NEGATIVE
ANION GAP SERPL CALC-SCNC: 13 MMOL/L (ref 3–18)
APPEARANCE UR: CLEAR
BACTERIA URNS QL MICRO: ABNORMAL /HPF
BARBITURATES UR QL SCN: NEGATIVE
BASOPHILS # BLD: 0.06 K/UL (ref 0–0.1)
BASOPHILS NFR BLD: 0.7 % (ref 0–2)
BENZODIAZ UR QL: NEGATIVE
BILIRUB UR QL: NEGATIVE
BUN SERPL-MCNC: 9 MG/DL (ref 6–23)
BUN/CREAT SERPL: 9 (ref 12–20)
CALCIUM SERPL-MCNC: 9.3 MG/DL (ref 8.5–10.1)
CANNABINOIDS UR QL SCN: POSITIVE
CAOX CRY URNS QL MICRO: ABNORMAL
CHLORIDE SERPL-SCNC: 105 MMOL/L (ref 98–107)
CO2 SERPL-SCNC: 24 MMOL/L (ref 21–32)
COCAINE UR QL SCN: POSITIVE
COLOR UR: YELLOW
CREAT SERPL-MCNC: 1.01 MG/DL (ref 0.6–1.3)
DIFFERENTIAL METHOD BLD: ABNORMAL
EOSINOPHIL # BLD: 0.03 K/UL (ref 0–0.4)
EOSINOPHIL NFR BLD: 0.3 % (ref 0–5)
EPITH CASTS URNS QL MICRO: ABNORMAL /LPF (ref 0–5)
ERYTHROCYTE [DISTWIDTH] IN BLOOD BY AUTOMATED COUNT: 12.8 % (ref 11.6–14.5)
ETHANOL SERPL-MCNC: 34 MG/DL (ref 0–0.08)
FENTANYL: NEGATIVE
GLUCOSE SERPL-MCNC: 94 MG/DL (ref 74–108)
GLUCOSE UR STRIP.AUTO-MCNC: NEGATIVE MG/DL
HCT VFR BLD AUTO: 42.5 % (ref 36–48)
HGB BLD-MCNC: 14.4 G/DL (ref 13–16)
HGB UR QL STRIP: NEGATIVE
IMM GRANULOCYTES # BLD AUTO: 0.02 K/UL (ref 0–0.04)
IMM GRANULOCYTES NFR BLD AUTO: 0.2 % (ref 0–0.5)
KETONES UR QL STRIP.AUTO: ABNORMAL MG/DL
LEUKOCYTE ESTERASE UR QL STRIP.AUTO: NEGATIVE
LYMPHOCYTES # BLD: 1.32 K/UL (ref 0.9–3.6)
LYMPHOCYTES NFR BLD: 14.6 % (ref 21–52)
Lab: ABNORMAL
MCH RBC QN AUTO: 30.3 PG (ref 24–34)
MCHC RBC AUTO-ENTMCNC: 33.9 G/DL (ref 31–37)
MCV RBC AUTO: 89.5 FL (ref 78–100)
METHADONE UR QL: NEGATIVE
MONOCYTES # BLD: 0.63 K/UL (ref 0.05–1.2)
MONOCYTES NFR BLD: 7 % (ref 3–10)
MUCOUS THREADS URNS QL MICRO: ABNORMAL /LPF
NEUTS SEG # BLD: 6.96 K/UL (ref 1.8–8)
NEUTS SEG NFR BLD: 77.2 % (ref 40–73)
NITRITE UR QL STRIP.AUTO: NEGATIVE
NRBC # BLD: 0 K/UL (ref 0–0.01)
NRBC BLD-RTO: 0 PER 100 WBC
OPIATES UR QL: NEGATIVE
OXYCODONE UR QL SCN: NEGATIVE
PH UR STRIP: 6.5 (ref 5–8)
PLATELET # BLD AUTO: 242 K/UL (ref 135–420)
PMV BLD AUTO: 9.6 FL (ref 9.2–11.8)
POTASSIUM SERPL-SCNC: 3.7 MMOL/L (ref 3.5–5.5)
PROT UR STRIP-MCNC: 30 MG/DL
RBC # BLD AUTO: 4.75 M/UL (ref 4.35–5.65)
RBC #/AREA URNS HPF: ABNORMAL /HPF (ref 0–5)
SODIUM SERPL-SCNC: 142 MMOL/L (ref 136–145)
SP GR UR REFRACTOMETRY: 1.02 (ref 1–1.03)
UROBILINOGEN UR QL STRIP.AUTO: 1 EU/DL (ref 0.2–1)
WBC # BLD AUTO: 9 K/UL (ref 4.6–13.2)
WBC URNS QL MICRO: ABNORMAL /HPF (ref 0–5)

## 2025-05-03 PROCEDURE — 99283 EMERGENCY DEPT VISIT LOW MDM: CPT

## 2025-05-03 PROCEDURE — 85025 COMPLETE CBC W/AUTO DIFF WBC: CPT

## 2025-05-03 PROCEDURE — 81001 URINALYSIS AUTO W/SCOPE: CPT

## 2025-05-03 PROCEDURE — 82077 ASSAY SPEC XCP UR&BREATH IA: CPT

## 2025-05-03 PROCEDURE — 80307 DRUG TEST PRSMV CHEM ANLYZR: CPT

## 2025-05-03 PROCEDURE — 90791 PSYCH DIAGNOSTIC EVALUATION: CPT | Performed by: SOCIAL WORKER

## 2025-05-03 PROCEDURE — 80048 BASIC METABOLIC PNL TOTAL CA: CPT

## 2025-05-03 SDOH — ECONOMIC STABILITY - HOUSING INSECURITY: HOMELESSNESS UNSPECIFIED: Z59.00

## 2025-05-03 ASSESSMENT — ENCOUNTER SYMPTOMS
VOMITING: 0
ABDOMINAL PAIN: 0
BACK PAIN: 0
NAUSEA: 0
COUGH: 0
SHORTNESS OF BREATH: 0
CHEST TIGHTNESS: 0

## 2025-05-03 ASSESSMENT — PAIN - FUNCTIONAL ASSESSMENT: PAIN_FUNCTIONAL_ASSESSMENT: NONE - DENIES PAIN

## 2025-05-03 NOTE — DISCHARGE INSTRUCTIONS
Call PCP for follow-up in office.  Review handout for resources for homelessness.  Return to ED for new or worsening/ symptoms.

## 2025-05-03 NOTE — ED PROVIDER NOTES
No edema.      Left lower leg: No edema.   Lymphadenopathy:      Cervical: No cervical adenopathy.   Skin:     General: Skin is warm.      Capillary Refill: Capillary refill takes less than 2 seconds.      Coloration: Skin is not jaundiced.      Findings: No erythema.   Neurological:      Mental Status: He is alert and oriented to person, place, and time.      Motor: No weakness.   Psychiatric:         Mood and Affect: Mood normal.         Behavior: Behavior normal.         DIAGNOSTIC RESULTS     Results for orders placed or performed during the hospital encounter of 05/03/25   CBC with Auto Differential   Result Value Ref Range    WBC 9.0 4.6 - 13.2 K/uL    RBC 4.75 4.35 - 5.65 M/uL    Hemoglobin 14.4 13.0 - 16.0 g/dL    Hematocrit 42.5 36.0 - 48.0 %    MCV 89.5 78.0 - 100.0 FL    MCH 30.3 24.0 - 34.0 PG    MCHC 33.9 31.0 - 37.0 g/dL    RDW 12.8 11.6 - 14.5 %    Platelets 242 135 - 420 K/uL    MPV 9.6 9.2 - 11.8 FL    Nucleated RBCs 0.0 0  WBC    nRBC 0.00 0.00 - 0.01 K/uL    Neutrophils % 77.2 (H) 40.0 - 73.0 %    Lymphocytes % 14.6 (L) 21.0 - 52.0 %    Monocytes % 7.0 3.0 - 10.0 %    Eosinophils % 0.3 0.0 - 5.0 %    Basophils % 0.7 0.0 - 2.0 %    Immature Granulocytes % 0.2 0.0 - 0.5 %    Neutrophils Absolute 6.96 1.80 - 8.00 K/UL    Lymphocytes Absolute 1.32 0.90 - 3.60 K/UL    Monocytes Absolute 0.63 0.05 - 1.20 K/UL    Eosinophils Absolute 0.03 0.00 - 0.40 K/UL    Basophils Absolute 0.06 0.00 - 0.10 K/UL    Immature Granulocytes Absolute 0.02 0.00 - 0.04 K/UL    Differential Type AUTOMATED     Basic Metabolic Panel   Result Value Ref Range    Sodium 142 136 - 145 mmol/L    Potassium 3.7 3.5 - 5.5 mmol/L    Chloride 105 98 - 107 mmol/L    CO2 24 21 - 32 mmol/L    Anion Gap 13 3.0 - 18.0 mmol/L    Glucose 94 74 - 108 mg/dL    BUN 9 6 - 23 MG/DL    Creatinine 1.01 0.6 - 1.3 MG/DL    BUN/Creatinine Ratio 9 (L) 12 - 20      Est, Glom Filt Rate >90 >60 ml/min/1.73m2    Calcium 9.3 8.5 - 10.1 MG/DL   Urine  are mis-transcribed.)    KRYSTA Mason (electronically signed)             Mellissa Osborne, KRYSTA  05/03/25 1922

## 2025-05-03 NOTE — VIRTUAL HEALTH
criteria for a psychiatric hold at this time  Ongoing medical management and stabilization per primary team.  Re-consult for any new changes or concerns. Thank you for this consult.  Discussed recommendations with Dylon GURROLA at time of consult completion.    TelePsych recommendations:Discharge            Safety Plan:  Updated        Electronically signed by LUCIEN Sloan on 5/3/2025 at 1:37 PM. Schuyler Murphy, was evaluated through a synchronous (real-time) audio-video encounter. The patient (and/or guardian if applicable) is aware that this is a billable service, which includes applicable co-pays. This virtual visit was conducted with patient's (and/or legal guardian's) consent. Patient identification was verified, and a caregiver was present when appropriate.  The patient was located at Home: [address] [place] [address]  The provider was located at Facility (Login Dept): [facility] TELEPSYCHIATRY PROGRAM  [address] [facility]  C/O VIRTUAL HEALTH TELEPSYCH  [facility] [id number] [phone]  Confirm you are appropriately licensed, registered, or certified to deliver care in the state where the patient is located as indicated above. If you are not or unsure, please re-schedule the visit: Yes, I confirm.   [place] Consult to Tele-Psych  Consult performed by: Deanna iWlkinson MSW  Consult ordered by: Mellissa Osborne FNP       Total time spent on this encounter: Not billed by time    --LUCIEN Sloan on 5/3/2025 at 1:37 PM    An electronic signature was used to authenticate this note.

## 2025-05-12 NOTE — PROGRESS NOTES
Belongings retrieved from locker and given to ambulance staff.   
Multiple attempts made to call report. Placed on hold.   
Multiple attempts made to call report. Placed on hold. Asked to speak with director of nursing or nursing supervisor as delay in care is now an issue. Patient transport has been in [facility] since 0800 and EMTALA cannot be printed without report.   
Multiple attempts made to call report. Placed on hold. Notified multiple staff members that  time is 0800.   
Patient remains alert, oriented, and ambulatory. Denies pain. Discharge information reviewed. Needs met. No acute distress noted. Left with ambulance.     
Never

## 2025-06-16 ENCOUNTER — HOSPITAL ENCOUNTER (EMERGENCY)
Facility: HOSPITAL | Age: 30
Discharge: HOME | End: 2025-06-17
Attending: EMERGENCY MEDICINE
Payer: COMMERCIAL

## 2025-06-16 VITALS
SYSTOLIC BLOOD PRESSURE: 115 MMHG | BODY MASS INDEX: 22.2 KG/M2 | RESPIRATION RATE: 16 BRPM | HEART RATE: 77 BPM | TEMPERATURE: 98.7 F | WEIGHT: 173 LBS | DIASTOLIC BLOOD PRESSURE: 75 MMHG | HEIGHT: 74 IN | OXYGEN SATURATION: 98 %

## 2025-06-16 DIAGNOSIS — Z59.00 HOMELESSNESS: Primary | ICD-10-CM

## 2025-06-16 LAB
AMPHET UR QL SCN: NEGATIVE
ANION GAP SERPL CALC-SCNC: 9 MMOL/L (ref 3–18)
APPEARANCE UR: CLEAR
BARBITURATES UR QL SCN: NEGATIVE
BASOPHILS # BLD: 0.06 K/UL (ref 0–0.1)
BASOPHILS NFR BLD: 0.8 % (ref 0–2)
BENZODIAZ UR QL: NEGATIVE
BILIRUB UR QL: NEGATIVE
BUN SERPL-MCNC: 14 MG/DL (ref 6–23)
BUN/CREAT SERPL: 11 (ref 12–20)
CALCIUM SERPL-MCNC: 10.1 MG/DL (ref 8.5–10.1)
CANNABINOIDS UR QL SCN: POSITIVE
CHLORIDE SERPL-SCNC: 104 MMOL/L (ref 98–107)
CO2 SERPL-SCNC: 28 MMOL/L (ref 21–32)
COCAINE UR QL SCN: POSITIVE
COLOR UR: YELLOW
CREAT SERPL-MCNC: 1.23 MG/DL (ref 0.6–1.3)
DIFFERENTIAL METHOD BLD: NORMAL
EOSINOPHIL # BLD: 0.35 K/UL (ref 0–0.4)
EOSINOPHIL NFR BLD: 4.6 % (ref 0–5)
ERYTHROCYTE [DISTWIDTH] IN BLOOD BY AUTOMATED COUNT: 12.8 % (ref 11.6–14.5)
ETHANOL SERPL-MCNC: <11 MG/DL (ref 0–0.08)
FENTANYL: POSITIVE
GLUCOSE SERPL-MCNC: 113 MG/DL (ref 74–108)
GLUCOSE UR STRIP.AUTO-MCNC: NEGATIVE MG/DL
HCT VFR BLD AUTO: 43.7 % (ref 36–48)
HGB BLD-MCNC: 14.4 G/DL (ref 13–16)
HGB UR QL STRIP: NEGATIVE
IMM GRANULOCYTES # BLD AUTO: 0.01 K/UL (ref 0–0.04)
IMM GRANULOCYTES NFR BLD AUTO: 0.1 % (ref 0–0.5)
KETONES UR QL STRIP.AUTO: ABNORMAL MG/DL
LEUKOCYTE ESTERASE UR QL STRIP.AUTO: NEGATIVE
LYMPHOCYTES # BLD: 2.47 K/UL (ref 0.9–3.6)
LYMPHOCYTES NFR BLD: 32.6 % (ref 21–52)
Lab: ABNORMAL
MCH RBC QN AUTO: 29.8 PG (ref 24–34)
MCHC RBC AUTO-ENTMCNC: 33 G/DL (ref 31–37)
MCV RBC AUTO: 90.5 FL (ref 78–100)
METHADONE UR QL: NEGATIVE
MONOCYTES # BLD: 0.72 K/UL (ref 0.05–1.2)
MONOCYTES NFR BLD: 9.5 % (ref 3–10)
NEUTS SEG # BLD: 3.96 K/UL (ref 1.8–8)
NEUTS SEG NFR BLD: 52.4 % (ref 40–73)
NITRITE UR QL STRIP.AUTO: NEGATIVE
NRBC # BLD: 0 K/UL (ref 0–0.01)
NRBC BLD-RTO: 0 PER 100 WBC
OPIATES UR QL: NEGATIVE
OXYCODONE UR QL SCN: NEGATIVE
PH UR STRIP: 6 (ref 5–8)
PLATELET # BLD AUTO: 211 K/UL (ref 135–420)
PMV BLD AUTO: 9.9 FL (ref 9.2–11.8)
POTASSIUM SERPL-SCNC: 4.3 MMOL/L (ref 3.5–5.5)
PROT UR STRIP-MCNC: NEGATIVE MG/DL
RBC # BLD AUTO: 4.83 M/UL (ref 4.35–5.65)
SODIUM SERPL-SCNC: 140 MMOL/L (ref 136–145)
SP GR UR REFRACTOMETRY: 1.02 (ref 1–1.03)
UROBILINOGEN UR QL STRIP.AUTO: 1 EU/DL (ref 0.2–1)
WBC # BLD AUTO: 7.6 K/UL (ref 4.6–13.2)

## 2025-06-16 PROCEDURE — 85025 COMPLETE CBC W/AUTO DIFF WBC: CPT

## 2025-06-16 PROCEDURE — 82077 ASSAY SPEC XCP UR&BREATH IA: CPT

## 2025-06-16 PROCEDURE — 80048 BASIC METABOLIC PNL TOTAL CA: CPT

## 2025-06-16 PROCEDURE — 81003 URINALYSIS AUTO W/O SCOPE: CPT

## 2025-06-16 PROCEDURE — 80307 DRUG TEST PRSMV CHEM ANLYZR: CPT

## 2025-06-16 PROCEDURE — 99283 EMERGENCY DEPT VISIT LOW MDM: CPT

## 2025-06-16 SDOH — ECONOMIC STABILITY - HOUSING INSECURITY: HOMELESSNESS UNSPECIFIED: Z59.00

## 2025-06-16 ASSESSMENT — LIFESTYLE VARIABLES: HOW OFTEN DO YOU HAVE A DRINK CONTAINING ALCOHOL: 2-3 TIMES A WEEK

## 2025-06-16 NOTE — ED TRIAGE NOTES
Pt states he is homeless with no where to go,   states he has been walking in the rain, my feet are soggy,  I need to go to the psych holliday.  When I asked why he states I have psych issues.  But I have no where to go.  Pt complaining of foot pain

## 2025-06-17 ASSESSMENT — LIFESTYLE VARIABLES
HOW MANY STANDARD DRINKS CONTAINING ALCOHOL DO YOU HAVE ON A TYPICAL DAY: 1 OR 2
HOW OFTEN DO YOU HAVE A DRINK CONTAINING ALCOHOL: MONTHLY OR LESS

## 2025-06-17 ASSESSMENT — ENCOUNTER SYMPTOMS: ABDOMINAL PAIN: 0

## 2025-06-17 NOTE — ED PROVIDER NOTES
Emergency Physician Note  [facility] EMERGENCY DEPARTMENT  [address] [address]  Dept: [phone]  Loc: [phone]  Open Note Time:  11:14 PM EDT     Chief Complaint   Homeless      Limitations of exam/history:  none     History of Present Illness   Schuyler Murphy is a 30 y.o. male  has a past medical history of ADHD, Bipolar affective ([facility]), Depression, Polysubstance abuse ([facility]), Schizophrenia ([facility]), Suicide attempt ([facility]), and TBI (traumatic brain injury) ([facility]). who presents to the ED with a chief complaint of homelessness, feet pain, and suicidal ideation.  Patient was initially brought in because he has been walking around town all day and is complaining of feet pain.  Patient initially said he had to be admitted to the psych holliday because he is homeless however he initially denied SI by the time I interviewed him patient was reporting SI since the SI is intermittent and he says this been going on for 1 month.  He does admit to cocaine use 3 days ago.  He admits to daily marijuana use, denies any alcohol use.    Nursing notes reviewed.     Medical History   I have reviewed the following from the nursing documentation:      Prior to Admission medications    Medication Sig Start Date End Date Taking? Authorizing Provider   mirtazapine (REMERON) 15 MG tablet Take 1 tablet by mouth nightly 4/10/25   Reyes Butler MD   traZODone (DESYREL) 50 MG tablet Take 1 tablet by mouth nightly 4/10/25   Reyes Butler MD   ARIPiprazole (ABILIFY) 10 MG tablet Take 1 tablet by mouth nightly 4/10/25   Reyes Butler MD   pantoprazole (PROTONIX) 40 MG tablet Take 1 tablet by mouth daily 4/11/25   Reyes Butler MD   Vitamin D (CHOLECALCIFEROL) 25 MCG (1000 UT) TABS tablet Take 1 tablet by mouth daily 4/11/25   Reyes Butler MD       Allergies as of 06/16/2025    (No Known Allergies)       Past Medical History:   Diagnosis Date    ADHD     Bipolar affective ([facility])

## 2025-06-17 NOTE — VIRTUAL HEALTH
Schuyler Murphy  [id number]  1995     Social Work Behavioral Health Crisis Assessment    06/16/25    Chief Complaint: Mental Health Evaluation    HPI: Patient is a 30 y.o. Black /  male who presents for mental health evaluation. Per EMR \"Schuyler Murphy is a 30 y.o. male  has a past medical history of ADHD, Bipolar affective (HCC), Depression, Polysubstance abuse (HCC), Schizophrenia (HCC), Suicide attempt (HCC), and TBI (traumatic brain injury) (HCC). who presents to the ED with a chief complaint of homelessness, feet pain, and suicidal ideation.  Patient was initially brought in because he has been walking around town all day and is complaining of feet pain.  Patient initially said he had to be admitted to the psych holliday because he is homeless however he initially denied SI by the time I interviewed him patient was reporting SI since the SI is intermittent and he says this been going on for 1 month.  He does admit to cocaine use 3 days ago.  He admits to daily marijuana use, denies any alcohol use.\"      Collateral: Unable to obtain collateral    Past Psychiatric History:  Previous Diagnoses/symptoms: TBI, schizophrenia, depression, bipolar, ADHD, mild intellectual disability, SI  Previous suicide attempts/self-harm: yes  Inpatient psychiatric hospitalizations: yes  Current outpatient psychiatric provider: Denies  Current therapist: denies  Previous psychiatric medication trials: yes  Current psychiatric medications: \" can't remember\"   Family Psychiatric History: yes     Sleep Hours: 1-2 hours of sleep     Sleep concerns: difficulty attaining sleep     Use of sleep medications: denies     Substance Abuse History:  Tobacco: Endorses    Alcohol: Endorses    Marijuana: Endorses    Stimulant: Endorses cocaine 3 times a week    Opiates: Denies  Benzodiazepine: Denies  Other illicit drug usage: Denies  History of substance/alcohol abuse treatment: Denies     Social History:  Education: H.S.

## 2025-07-08 ENCOUNTER — HOSPITAL ENCOUNTER (EMERGENCY)
Facility: HOSPITAL | Age: 30
Discharge: HOME | End: 2025-07-08
Attending: STUDENT IN AN ORGANIZED HEALTH CARE EDUCATION/TRAINING PROGRAM
Payer: MEDICAID

## 2025-07-08 VITALS
SYSTOLIC BLOOD PRESSURE: 116 MMHG | TEMPERATURE: 97.6 F | HEIGHT: 74 IN | RESPIRATION RATE: 18 BRPM | DIASTOLIC BLOOD PRESSURE: 72 MMHG | BODY MASS INDEX: 22.21 KG/M2 | HEART RATE: 79 BPM | OXYGEN SATURATION: 100 %

## 2025-07-08 DIAGNOSIS — E16.2 HYPOGLYCEMIA: ICD-10-CM

## 2025-07-08 DIAGNOSIS — T73.0XXA FOOD HUNGER, INITIAL ENCOUNTER: ICD-10-CM

## 2025-07-08 DIAGNOSIS — F43.21 ADJUSTMENT DISORDER WITH DEPRESSED MOOD: ICD-10-CM

## 2025-07-08 DIAGNOSIS — R45.851 SUICIDAL IDEATION: Primary | ICD-10-CM

## 2025-07-08 DIAGNOSIS — F19.10 POLYSUBSTANCE ABUSE (HCC): ICD-10-CM

## 2025-07-08 LAB
AMPHET UR QL SCN: NEGATIVE
ANION GAP SERPL CALC-SCNC: 9 MMOL/L (ref 3–18)
BARBITURATES UR QL SCN: NEGATIVE
BASOPHILS # BLD: 0.05 K/UL (ref 0–0.1)
BASOPHILS NFR BLD: 0.9 % (ref 0–2)
BENZODIAZ UR QL: NEGATIVE
BUN SERPL-MCNC: 13 MG/DL (ref 6–23)
BUN/CREAT SERPL: 11 (ref 12–20)
CALCIUM SERPL-MCNC: 9.5 MG/DL (ref 8.5–10.1)
CANNABINOIDS UR QL SCN: POSITIVE
CHLORIDE SERPL-SCNC: 107 MMOL/L (ref 98–107)
CHP ED QC CHECK: 95
CO2 SERPL-SCNC: 27 MMOL/L (ref 21–32)
COCAINE UR QL SCN: POSITIVE
CREAT SERPL-MCNC: 1.12 MG/DL (ref 0.6–1.3)
DIFFERENTIAL METHOD BLD: ABNORMAL
EOSINOPHIL # BLD: 0.2 K/UL (ref 0–0.4)
EOSINOPHIL NFR BLD: 3.5 % (ref 0–5)
ERYTHROCYTE [DISTWIDTH] IN BLOOD BY AUTOMATED COUNT: 13 % (ref 11.6–14.5)
ETHANOL SERPL-MCNC: <11 MG/DL (ref 0–0.08)
FENTANYL: POSITIVE
GLUCOSE BLD STRIP.AUTO-MCNC: 95 MG/DL (ref 70–110)
GLUCOSE SERPL-MCNC: 65 MG/DL (ref 74–108)
HCT VFR BLD AUTO: 50.5 % (ref 36–48)
HGB BLD-MCNC: 16.2 G/DL (ref 13–16)
IMM GRANULOCYTES # BLD AUTO: 0.01 K/UL (ref 0–0.04)
IMM GRANULOCYTES NFR BLD AUTO: 0.2 % (ref 0–0.5)
LYMPHOCYTES # BLD: 1.68 K/UL (ref 0.9–3.6)
LYMPHOCYTES NFR BLD: 29.7 % (ref 21–52)
Lab: ABNORMAL
MCH RBC QN AUTO: 29.1 PG (ref 24–34)
MCHC RBC AUTO-ENTMCNC: 32.1 G/DL (ref 31–37)
MCV RBC AUTO: 90.7 FL (ref 78–100)
METHADONE UR QL: NEGATIVE
MONOCYTES # BLD: 0.29 K/UL (ref 0.05–1.2)
MONOCYTES NFR BLD: 5.1 % (ref 3–10)
NEUTS SEG # BLD: 3.43 K/UL (ref 1.8–8)
NEUTS SEG NFR BLD: 60.6 % (ref 40–73)
NRBC # BLD: 0 K/UL (ref 0–0.01)
NRBC BLD-RTO: 0 PER 100 WBC
OPIATES UR QL: NEGATIVE
OXYCODONE UR QL SCN: NEGATIVE
PLATELET # BLD AUTO: 239 K/UL (ref 135–420)
PMV BLD AUTO: 9.9 FL (ref 9.2–11.8)
POTASSIUM SERPL-SCNC: 4 MMOL/L (ref 3.5–5.5)
RBC # BLD AUTO: 5.57 M/UL (ref 4.35–5.65)
SODIUM SERPL-SCNC: 143 MMOL/L (ref 136–145)
WBC # BLD AUTO: 5.7 K/UL (ref 4.6–13.2)

## 2025-07-08 PROCEDURE — 80307 DRUG TEST PRSMV CHEM ANLYZR: CPT

## 2025-07-08 PROCEDURE — 82962 GLUCOSE BLOOD TEST: CPT

## 2025-07-08 PROCEDURE — 80048 BASIC METABOLIC PNL TOTAL CA: CPT

## 2025-07-08 PROCEDURE — 93005 ELECTROCARDIOGRAM TRACING: CPT | Performed by: STUDENT IN AN ORGANIZED HEALTH CARE EDUCATION/TRAINING PROGRAM

## 2025-07-08 PROCEDURE — 99285 EMERGENCY DEPT VISIT HI MDM: CPT

## 2025-07-08 PROCEDURE — 82077 ASSAY SPEC XCP UR&BREATH IA: CPT

## 2025-07-08 PROCEDURE — 85025 COMPLETE CBC W/AUTO DIFF WBC: CPT

## 2025-07-08 ASSESSMENT — PAIN - FUNCTIONAL ASSESSMENT: PAIN_FUNCTIONAL_ASSESSMENT: 0-10

## 2025-07-08 ASSESSMENT — PAIN SCALES - GENERAL: PAINLEVEL_OUTOF10: 0

## 2025-07-08 NOTE — VIRTUAL HEALTH
cognitive dysfunction, Has outpatient services in place, and Compliant with recommended medications  Risk Factors:  Risk Factors: Male gender, Active suicidal ideation, Prior suicide attempt, Active substance abuse, Cognitive impairment, and No collateral information to support safety      Overall Level Suicide Risk:     [facility] CSSRS Risk Level: High Risk- pt has chronic SI at baseline    Brief ClinicalSummary:   Patient is a 30 y.o.  male who presents for suicidal ideation with plan to starve himself. Pt also told writer his plan is to walk into traffic. However he later denied this and states the reason he came to the ED was because he doesn't have an ID and can't get his food stamp card, which made him feel frustrated. D/t pt's intellectual disability, his thought process is concrete and it is often difficult to understand what assistance pt is looking for. However he is ultimately fixated on housing and assistance with food stamps rather than psychiatric treatment and appears to be at baseline based on chart review. He denies SI/HI, AH/VH, access to weapons. Writer did confirm he is still active with [facility] and he was encouraged to follow up with them for outpatient treatment and assistance in the community. Multiple attempts were made to obtain additional collateral but was unsuccessful. Resources provided in safety plan. Pt appears to be at baseline and is cleared to be discharged from a psychiatric standpoint. ED provider in agreement.     Dx:   Schizoaffective Disorder     Plan:  The patient is cleared to be discharged from a psychiatric point of view, when medically appropriate.  Patient does not meet criteria for a psychiatric hold at this time  Ongoing medical management and stabilization per primary team.  Re-consult for any new changes or concerns. Thank you for this consult.  Discussed recommendations with Parth Padilla Jr., DO at time of consult completion.    [facility]

## 2025-07-08 NOTE — ED PROVIDER NOTES
EMERGENCY DEPARTMENT HISTORY AND PHYSICAL EXAM      Date: 7/8/2025  Patient Name: Schuyler Murphy    History of Presenting Illness     Chief Complaint   Patient presents with    Suicidal       History (Context): Schuyler Murphy is a 30 y.o. male  presents to the ED today with chief complaint of suicidal ideation.  Patient states that he has been feeling suicidal without plan since he found out that he does not have \"my food stamps.\"  Denies any homicidal ideation or hallucinations.  Denies any further complaints.      PCP: No primary care provider on file.    No current facility-administered medications for this encounter.     Current Outpatient Medications   Medication Sig Dispense Refill    mirtazapine (REMERON) 15 MG tablet Take 1 tablet by mouth nightly 30 tablet 0    traZODone (DESYREL) 50 MG tablet Take 1 tablet by mouth nightly 30 tablet 0    ARIPiprazole (ABILIFY) 10 MG tablet Take 1 tablet by mouth nightly 30 tablet 0    pantoprazole (PROTONIX) 40 MG tablet Take 1 tablet by mouth daily 30 tablet 0    Vitamin D (CHOLECALCIFEROL) 25 MCG (1000 UT) TABS tablet Take 1 tablet by mouth daily 30 tablet 0       Past History     Past Medical History:   Past Medical History:   Diagnosis Date    ADHD     Bipolar affective (HCC)     Depression     Polysubstance abuse (HCC)     Schizophrenia (HCC)     Suicide attempt (HCC)     TBI (traumatic brain injury) (HCC)        Past Surgical History:  No past surgical history on file.    Family History:  No family history on file.    Social History:   Social History     Tobacco Use    Smoking status: Never    Smokeless tobacco: Never   Substance Use Topics    Alcohol use: No       Allergies:  No Known Allergies      Physical Exam     Vitals:    07/08/25 1102   BP: 116/72   Pulse: 79   Resp: 18   Temp: 97.6 °F (36.4 °C)   SpO2: 100%   Height: 1.88 m (6' 2\")       Physical Exam  Constitutional:       General: He is not in acute distress.     Appearance: He is not ill-appearing or

## 2025-07-08 NOTE — DISCHARGE INSTRUCTIONS
Food Resources  Food Pantries:  [facility]  [address]  [phone]  Tue/Thur 9am-11am  [facility]  [address]  [phone]  Tue/Sat 10am  [facility]  [address]  [phone]  Thur 10am-330pm  [facility]   [address]  [phone]  Fri 10am-12pm  [facility]   [address]  [phone]  Tu/Thur 5pm-6pm  [facility]  [address]  [phone]  4th Monday every month 930am-11am  [facility]  [address]  [phone]  Tues/Fri/Sat 7am-8am  [employer]:  [facility]   Monday-Thursday 7-745am, 1145am-1245pm, And Saturday 12-1pm    Shelters  [facility]  [phone] (DV) Office   [phone] (DV) Hotline   [phone] Homeless Crisis Hotline  POC: is Yamilex Domínguez     [phone] ex. [address]  yamilex@[employer]  [place] [facility]  Homeless Hotline 443-470-4733  POC: Esvin De Jesus  , housing assessments  Cell: [phone]  [email or web address].[employer] housing homeless  170- [phone]  POC: Kamico Tracy  [phone]  [email or web address]  [phone]  [facility]  [facility]  [phone]

## 2025-07-08 NOTE — ED NOTES
Pt changed into blue paper scrubs and red socks.   Pts labs, EKG and urine sent down to lab.   Pt to [facility]

## 2025-07-08 NOTE — ED TRIAGE NOTES
Pt in ED with c/o feeling suicidal. Pt states he has a plan to starve hisself. Pt currently in triage eating 2 hunny buns.

## 2025-07-09 LAB
EKG ATRIAL RATE: 75 BPM
EKG DIAGNOSIS: NORMAL
EKG P AXIS: 77 DEGREES
EKG P-R INTERVAL: 154 MS
EKG Q-T INTERVAL: 384 MS
EKG QRS DURATION: 92 MS
EKG QTC CALCULATION (BAZETT): 428 MS
EKG R AXIS: 82 DEGREES
EKG T AXIS: 69 DEGREES
EKG VENTRICULAR RATE: 75 BPM

## 2025-07-09 PROCEDURE — 93010 ELECTROCARDIOGRAM REPORT: CPT | Performed by: INTERNAL MEDICINE

## 2025-07-21 ENCOUNTER — HOSPITAL ENCOUNTER (EMERGENCY)
Facility: HOSPITAL | Age: 30
Discharge: HOME | End: 2025-07-21
Payer: MEDICAID

## 2025-07-21 VITALS
WEIGHT: 150 LBS | SYSTOLIC BLOOD PRESSURE: 117 MMHG | TEMPERATURE: 99.3 F | HEART RATE: 74 BPM | BODY MASS INDEX: 19.25 KG/M2 | RESPIRATION RATE: 16 BRPM | OXYGEN SATURATION: 100 % | DIASTOLIC BLOOD PRESSURE: 73 MMHG | HEIGHT: 74 IN

## 2025-07-21 DIAGNOSIS — J02.0 STREPTOCOCCAL SORE THROAT: Primary | ICD-10-CM

## 2025-07-21 LAB
FLUAV RNA SPEC QL NAA+PROBE: NOT DETECTED
FLUBV RNA SPEC QL NAA+PROBE: NOT DETECTED
S PYO DNA THROAT QL NAA+PROBE: DETECTED
SARS-COV-2 RNA RESP QL NAA+PROBE: NOT DETECTED
SOURCE: NORMAL

## 2025-07-21 PROCEDURE — 87636 SARSCOV2 & INF A&B AMP PRB: CPT

## 2025-07-21 PROCEDURE — 87651 STREP A DNA AMP PROBE: CPT

## 2025-07-21 PROCEDURE — 99283 EMERGENCY DEPT VISIT LOW MDM: CPT

## 2025-07-21 PROCEDURE — 6370000000 HC RX 637 (ALT 250 FOR IP)

## 2025-07-21 RX ORDER — DEXAMETHASONE SODIUM PHOSPHATE 10 MG/ML
10 INJECTION, SOLUTION INTRAMUSCULAR; INTRAVENOUS ONCE
OUTPATIENT
Start: 2025-07-21

## 2025-07-21 RX ORDER — AMOXICILLIN 500 MG/1
500 CAPSULE ORAL 2 TIMES DAILY
Qty: 20 CAPSULE | Refills: 0 | Status: SHIPPED | OUTPATIENT
Start: 2025-07-21 | End: 2025-07-31

## 2025-07-21 RX ORDER — IBUPROFEN 600 MG/1
600 TABLET, FILM COATED ORAL
Status: COMPLETED | OUTPATIENT
Start: 2025-07-21 | End: 2025-07-21

## 2025-07-21 RX ADMIN — AMOXICILLIN AND CLAVULANATE POTASSIUM 1 TABLET: 875; 125 TABLET, FILM COATED ORAL at 11:54

## 2025-07-21 RX ADMIN — IBUPROFEN 600 MG: 600 TABLET ORAL at 11:54

## 2025-07-21 ASSESSMENT — ENCOUNTER SYMPTOMS
CHEST TIGHTNESS: 0
BACK PAIN: 0
COUGH: 0
SHORTNESS OF BREATH: 0
SORE THROAT: 1
VOMITING: 0
TROUBLE SWALLOWING: 0
ABDOMINAL PAIN: 0
NAUSEA: 0

## 2025-07-21 ASSESSMENT — PAIN SCALES - GENERAL: PAINLEVEL_OUTOF10: 10

## 2025-07-21 ASSESSMENT — PAIN DESCRIPTION - LOCATION: LOCATION: THROAT

## 2025-07-21 ASSESSMENT — PAIN DESCRIPTION - DESCRIPTORS: DESCRIPTORS: ACHING

## 2025-07-21 NOTE — DISCHARGE INSTRUCTIONS
Call PCP for follow-up in office.  Adequate hydration.   Take antibiotics as prescribed.  Return to ED for new or worsening/ symptoms.

## 2025-07-21 NOTE — ED TRIAGE NOTES
Pt states he has neck swelling and difficulty swallowing. Pt walked [place] to the fire station and asked to be transported to ED.  Hx bipolar, schizophrenic. Denies hearing voices, being HI or SI at this time.   Denies SOB or CP.  Pt lethargic in triage, and resistant to answering questions.  Says he wants a cab home.  Says every time he answers questions about drugs and alcohol, we never give him a cab, and make him walk home.   Pt does say his swallowing gets tough when he drinks alcohol.

## 2025-07-21 NOTE — ED PROVIDER NOTES
[facility] EMERGENCY DEPARTMENT  EMERGENCY DEPARTMENT ENCOUNTER      Pt Name: Schuyler Murphy  MRN: [id number]  Birthdate 1995  Date of evaluation: 7/21/2025  Provider: KRYSTA Mason  8:48 PM    CHIEF COMPLAINT       Chief Complaint   Patient presents with    Neck Pain         HISTORY OF PRESENT ILLNESS    Schuyler Murphy is a 30 y.o. male who presents to the emergency department with sore throat.      30-year-old male with prior medical history of GERD, TBI, schizophrenia, substance abuse presents to the emergency department with sore throat.  Patient reports sore throat x 2 days.  Denies fever or chills.  Denies SI/HI.  Denies difficulty swallowing or managing secretions.          Nursing Notes were reviewed.    REVIEW OF SYSTEMS       Review of Systems   Constitutional:  Negative for chills, fatigue and fever.   HENT:  Positive for sore throat. Negative for congestion, ear pain and trouble swallowing.    Eyes:  Negative for visual disturbance.   Respiratory:  Negative for cough, chest tightness and shortness of breath.    Cardiovascular:  Negative for chest pain and palpitations.   Gastrointestinal:  Negative for abdominal pain, nausea and vomiting.   Genitourinary:  Negative for difficulty urinating, dysuria and flank pain.   Musculoskeletal:  Negative for back pain, myalgias, neck pain and neck stiffness.   Skin:  Negative for pallor and wound.   Neurological:  Negative for dizziness, seizures, syncope, weakness, numbness and headaches.   Hematological: Negative.    Psychiatric/Behavioral: Negative.         Except as noted above the remainder of the review of systems was reviewed and negative.       PAST MEDICAL HISTORY     Past Medical History:   Diagnosis Date    ADHD     Bipolar affective (HCC)     Depression     Polysubstance abuse (HCC)     Schizophrenia (HCC)     Suicide attempt (HCC)     TBI (traumatic brain injury) ([facility])          SURGICAL HISTORY     No past surgical history on